# Patient Record
Sex: FEMALE | Race: WHITE | Employment: FULL TIME | URBAN - METROPOLITAN AREA
[De-identification: names, ages, dates, MRNs, and addresses within clinical notes are randomized per-mention and may not be internally consistent; named-entity substitution may affect disease eponyms.]

---

## 2019-10-31 ENCOUNTER — OFFICE VISIT (OUTPATIENT)
Dept: OBGYN CLINIC | Age: 29
End: 2019-10-31

## 2019-10-31 VITALS
WEIGHT: 161 LBS | HEIGHT: 66 IN | BODY MASS INDEX: 25.88 KG/M2 | DIASTOLIC BLOOD PRESSURE: 54 MMHG | SYSTOLIC BLOOD PRESSURE: 98 MMHG

## 2019-10-31 DIAGNOSIS — Z23 ENCOUNTER FOR IMMUNIZATION: ICD-10-CM

## 2019-10-31 DIAGNOSIS — Z84.89 FAMILY HISTORY OF GENETIC DISORDER: ICD-10-CM

## 2019-10-31 DIAGNOSIS — Z34.00 ENCOUNTER FOR SUPERVISION PREGNANCY IN PRIMIGRAVIDA, ANTEPARTUM: ICD-10-CM

## 2019-10-31 DIAGNOSIS — Z34.90 ENCOUNTER FOR SUPERVISION OF LOW-RISK PREGNANCY, ANTEPARTUM: Primary | ICD-10-CM

## 2019-10-31 LAB
ANTIBODY SCREEN, EXTERNAL: NEGATIVE
CYSTIC FIBROSIS, EXTERNAL: NEGATIVE
HBSAG, EXTERNAL: NEGATIVE
HCT, EXTERNAL: 40.3
HGB, EXTERNAL: 13.5
HIV, EXTERNAL: NORMAL
PLATELET CNT,   EXTERNAL: 218
RUBELLA, EXTERNAL: 1.11
T. PALLIDUM, EXTERNAL: NEGATIVE
TYPE, ABO & RH, EXTERNAL: NORMAL
URINALYSIS, EXTERNAL: NEGATIVE

## 2019-10-31 NOTE — PATIENT INSTRUCTIONS

## 2019-10-31 NOTE — PROGRESS NOTES
[facility]  [email or web address]  [phone]    Juancarlos Ritter MD, [address]     Chief complaint:  Irregular cycles  Last cycle; Patient's last menstrual period was 2019 (approximate). This is a new concern and and evaluation is planned. Current pregnancy history:  Augusto Simon is a No obstetric history on file. , 34 y.o. female [facility]   She presents for the evaluation of irregular menses and a positive pregnancy test.    LMP history:  Patient's last menstrual period was 2019 (approximate). .  The date of the beginning of her last menstrual period is certain, but it was shorter than usual.  Her menses are not regular. Her cycles occur about every 40 days  A urine pregnancy test was positive about 3 weeks ago. She was not using contraception at the estimated time of conception. Based on her LMP, her EGA is 8 weeks,2 days with and EDC of 20. Ultrasound data:  She had an ultrasound today which revealed a viable jimenez pregnancy with a gestational age of 7 weeks and 1 days giving an EDC of 20. Pregnancy symptoms:  She reports fatigue, breast tenderness, indigestion. She denies vomiting or pelvic pain. Since she found out she is pregnant, she has gained, 5 lbs. She reports her prepregnancy weight as 155 pounds. Relevant past pregnancy history:  She has the following pregnancy history:none. She does not have a history of  delivery. She does not have a history of a prior  section. Relevant past medical history:(relevant to this pregnancy):   none     Pap smear history:  Last pap smear: 2018  Results: within normal limits    Occupational history  Her occupation is: . Substance history:   She does not report current tobacco use. She does not report current alcohol use. She does not report current drug use. Exposure history: There are not indoor cat(s) in the home.   The patient was instructed not to change cat litter boxes during pregnancy. She does not report close contact with children on a regular basis. She has had chicken pox in the past.   Patient does not report issues with domestic violence. Genetic Screening/Teratology Counseling:   (Includes patient, baby's father, or anyone in either family with:)  3.  Patient's age >/= 28 at EDC?--29 y.o.       FOB age: 31years old. 2.  Thalassemia ([facility], [facility], or  background): MCV<80?--no  3. Neural tube defect (meningomyelocele, spina bifida, anencephaly)? --no  4. Congenital heart defect?--no  5. Down syndrome?--no  6. Jet-[employer]s ([address] [place] [address])? --no  7. Canavan's Disease?--no  8. Familial Dysautonomia?--no   9. Sickle cell disease or trait ()? --no   Has she been tested for sickle trait: Unknown  10. Hemophilia or other blood disorders?--no  11. Muscular dystrophy?--no  12. Cystic fibrosis? --no  13. [place]'s Chorea?--no  14. Mental retardation/autism (if yes was person tested for Fragile X)? -FOB: cousins once removed. MR male. Male 2nd cousin. 15.  Other inherited genetic or chromosomal disorder?- FH, hip dysplasia b/l pt's father. 12.  Maternal metabolic disorder (DM, PKU, etc)? --PCOS  17.  Patient or FOB with a child with a birth defect not listed above?--no  17a. Patient or FOB with a birth defect themselves?--no  25. Recurrent pregnancy loss, or stillbirth?--no  19. Any medications since LMP other than prenatal vitamins (include vitamins, supplements, OTC meds, drugs, alcohol)? -- pnv  20. Any other genetic/environmental exposure to discuss?--no. Infection History:  1. Lives with someone with TB or TB exposed?--no  2. Patient or partner has history of genital herpes?--no  3. Rash or viral illness since LMP?--no  4. History of STD (GC, CT, HPV, syphilis, HIV)? --no  5. Have you received a flu vaccine for the most recent flu season? -- no  6.   Have you or your sexual partner(s) travelled to a [address] area in the last 3 months? -- no    Past Medical History:   Diagnosis Date    PCOS (polycystic ovarian syndrome) 2018     Past Surgical History:   Procedure Laterality Date    HX WISDOM TEETH EXTRACTION  2017     Social History     Occupational History    Not on file   Tobacco Use    Smoking status: Never Smoker    Smokeless tobacco: Never Used   Substance and Sexual Activity    Alcohol use: Not Currently    Drug use: Never    Sexual activity: Yes     Partners: Male     Birth control/protection: None     Family History   Problem Relation Age of Onset    Endometriosis Mother     Other Mother         Abnormal uterine bleeding    No Known Problems Father     Breast Cancer Maternal Grandmother 52     OB History    Para Term  AB Living   1             SAB TAB Ectopic Molar Multiple Live Births                    # Outcome Date GA Lbr Nikita/2nd Weight Sex Delivery Anes PTL Lv   1 Current              No Known Allergies  Prior to Admission medications    Medication Sig Start Date End Date Taking? Authorizing Provider   prenatal vit,calc76/iron/folic (PNV 13-3 PO) Take  by mouth.    Yes Provider, Historical        Review of Systems - History obtained from the patient  Constitutional: negative for weight loss, fever, night sweats  HEENT: negative for hearing loss, earache, congestion, snoring, sorethroat  CV: negative for chest pain, palpitations, edema  Resp: negative for cough, shortness of breath, wheezing  GI: negative for change in bowel habits, abdominal pain, black or bloody stools  : negative for frequency, dysuria, hematuria, vaginal discharge  MSK: negative for back pain, joint pain, muscle pain  Breast: negative for breast lumps, nipple discharge, galactorrhea  Skin :negative for itching, rash, hives  Neuro: negative for dizziness, headache, confusion, weakness  Psych: negative for anxiety, depression, change in mood  Heme/lymph: negative for bleeding, bruising, pallor    Objective:  Visit Vitals  BP 98/54   Ht 5' 6\" (1.676 m)   Wt 161 lb (73 kg)   LMP 09/03/2019 (Approximate) Comment: \"shorter cycle than usual\"   BMI 25.99 kg/m²       Physical Exam:   Constitutional  · Appearance: well-nourished, well developed, alert, in no acute distress    HENT  · Head  · Face: appears normal  · Eyes: appear normal  · Ears: normal  · Mouth: normal  · Lips: no lesions    Neck  · Inspection/Palpation: normal appearance, no masses or tenderness  · Lymph Nodes: no lymphadenopathy present  · Thyroid: gland size normal, nontender, no nodules or masses present on palpation    Chest  · Respiratory Effort: breathing unlabored  · Auscultation: normal breath sounds    Cardiovascular  · Heart:  · Auscultation: regular rate and rhythm without murmur    Breasts  · Inspection of Breasts: breasts symmetrical, no skin changes, no discharge present, nipple appearance normal, no skin retraction present  · Palpation of Breasts and Axillae: no masses present on palpation, no breast tenderness  · Axillary Lymph Nodes: no lymphadenopathy present    Gastrointestinal  · Abdominal Examination: abdomen non-tender to palpation, normal bowel sounds, no masses present  · Liver and spleen: no hepatomegaly present, spleen not palpable  · Hernias: no hernias identified    Genitourinary  · External Genitalia: normal appearance for age, no discharge present, no tenderness present, no inflammatory lesions present, no masses present, no atrophy present  · Vagina: normal vaginal vault without central or paravaginal defects, no discharge present, no inflammatory lesions present, no masses present  · Bladder: non-tender to palpation  · Urethra: appears normal  · Cervix: normal appearing with discharge or lesions, os closed  · Uterus: enlarged, normal shape, soft  · Adnexa: no adnexal tenderness present, no adnexal masses present  · Perineum: perineum within normal limits, no evidence of trauma, no rashes or skin lesions present  · Anus: anus within normal limits, no hemorrhoids present  · Inguinal Lymph Nodes: no lymphadenopathy present    Skin  · General Inspection: no rash, no lesions identified    Neurologic/Psychiatric  · Mental Status:  · Orientation: grossly oriented to person, place and time  · Mood and Affect: mood normal, affect appropriate    Assessment:   Irregular cycles  Encounter Diagnoses   Name Primary?  Encounter for supervision of low-risk pregnancy, antepartum Yes    Family history of genetic disorder     Encounter for supervision pregnancy in primigravida, antepartum      Due date: US    Plan:   We discussed options of genetic screening and diagnostic testing including:  CF testing, CVS, amniocentesis first trimester screening/NT, MSAFP, and NIPT (handout given to patient for review and consent)  She is not interested in prenatal genetic testing of her fetus. Plan: 20 wk us, fragile x sma cf  Decline NT/nips  The course of pregnancy discussed including visit schedule, ultrasounds, lab testing, etc.  Pt advised to avoid alcoholic beverages and illicit/recreational drugs use  Recommend taking prenatal vitamins or folic acid daily with DHA/fish oil. The hospital and practice style discussed with coverage system. We discussed nutrition, toxoplasmosis precautions, sexual activity, exercise, need for influenza vaccine, environmental and work hazards, travel advice, screen for domestic violence, need for seat belts. We discussed seafood, unpasteurized dairy products, deli meat, artificial sweeteners, and caffeine intake. We recommend avoiding chemical and toxin exposures when possible. Information on prenatal and breastfeeding classes given. Information on circumcision given  Patient encouraged not to smoke. Discussed current prescription drug use. Given medication list.  Discussed the use of over the counter medications and chemicals.   She is advised to contact her MD with any questions. Pt understands risk of hemorrhage during pregnancy and post delivery and would accept blood products if necessary in life-threatening emergencies  We discussed signs and symptoms of abnormal pregnancies and miscarriage. Handouts given to pt. Early glucola for PCOS    Physician review of ultrasound performed by technician    TA ULTRASOUND PERFORMED  A SINGLE VIABLE 6W1D IUP IS SEEN WITH NORMAL CARDIAC RHYTHM. GESTATIONAL AGE BASED ON TODAYS ULTRASOUND. A NORMAL YOLK SAC IS SEEN. RIGHT OVARY APPEARS WITHIN NORMAL LIMITS. LEFT OVARY APPEARS WITHIN NORMAL LIMITS. NO FREE FLUID SEEN IN THE CDS. Today's ultrasound report and images were reviewed and discussed with the patient. Please see images and imaging report entered by technician in PACS for more detail and progress note and diagnosis entered by MD.    Imani Ramon MD    Orders Placed This Encounter    CULTURE, URINE    HEP B SURFACE AG    HIV SCREEN, [address] [place] [address]. W/REFLEX CONFIRM    CBC W/O DIFF    RUBELLA AB, IGG    T PALLIDUM SCREEN W/REFLEX    CYSTIC FIBROSIS MUTATION 80    SMN1 COPY NUMBER ANALYSIS    FRAGILE X SYNDROME, PCR WITH REFLEX TO SOUTHERN BLOT    HEMOGLOBIN FRACTIONATION    TYPE, ABO & RH    ANTIBODY SCREEN    prenatal vit,calc76/iron/folic (PNV 36-8 PO)    PAP IG, CT-NG, RFX APTIMA HPV ASCUS ([id number], [id number]))     Follow-up and Dispositions    · Return in about 5 weeks (around 12/5/2019) for Follow up OB visit.

## 2019-11-01 NOTE — PROGRESS NOTES
After obtaining consent, and per orders of Dr Ethel Driscoll, injection of Fluarix given in left deltoid by Nissa Cardenas LPN. Patient instructed to remain in clinic for 20 minutes afterwards, and to report any adverse reaction to me immediately. Lot: [id number] Exp: 6/30/20 NDC: 52955-477-76 , VIS given.

## 2019-11-02 LAB — BACTERIA UR CULT: NORMAL

## 2019-11-05 LAB
C TRACH RRNA CVX QL NAA+PROBE: NEGATIVE
CYTOLOGIST CVX/VAG CYTO: NORMAL
CYTOLOGY CVX/VAG DOC CYTO: NORMAL
CYTOLOGY CVX/VAG DOC THIN PREP: NORMAL
DX ICD CODE: NORMAL
LABCORP, 190119: NORMAL
Lab: NORMAL
N GONORRHOEA RRNA CVX QL NAA+PROBE: NEGATIVE
OTHER STN SPEC: NORMAL
STAT OF ADQ CVX/VAG CYTO-IMP: NORMAL

## 2019-11-05 NOTE — PROGRESS NOTES
Normal pap smear  Update per TP protocol. Update PMH/HM: include:  Date of pap, Cytology: wnl. For HR HPV results: list NEG or POS, when done.   Update PNL

## 2019-11-09 LAB
ABO GROUP BLD: NORMAL
ANNOTATION COMMENT IMP: NORMAL
ANNOTATION COMMENT IMP: NORMAL
BLD GP AB SCN SERPL QL: NEGATIVE
CARRIER DETECTION RATE, 450111: NORMAL
CFTR MUT ANL BLD/T: NORMAL
CLINICAL DATA, 450005: NORMAL
COMMENT: NORMAL
ELECTRONICALLY SIGNED BY, 450014: NORMAL
ERYTHROCYTE [DISTWIDTH] IN BLOOD BY AUTOMATED COUNT: 11.9 % (ref 12.3–15.4)
ETHNIC BACKGROUND STATED: NORMAL
FMR1 GENE CGG RPT BLD/T QL: NORMAL
GEN KNWLDGE REF ID: NORMAL
GENE DIS ANL CARRIER INTERP-IMP: NORMAL
GENETIC COUNSELOR, 450001: NORMAL
HBV SURFACE AG SERPL QL IA: NEGATIVE
HCT VFR BLD AUTO: 40.3 % (ref 34–46.6)
HGB A MFR BLD: 97.9 % (ref 96.4–98.8)
HGB A2 MFR BLD COLUMN CHROM: 2.1 % (ref 1.8–3.2)
HGB BLD-MCNC: 13.5 G/DL (ref 11.1–15.9)
HGB C MFR BLD: 0 %
HGB F MFR BLD: 0 % (ref 0–2)
HGB FRACT BLD-IMP: NORMAL
HGB OTHER MFR BLD HPLC: 0 %
HGB S BLD QL SOLY: NEGATIVE
HGB S MFR BLD: 0 %
HIV 1+2 AB+HIV1 P24 AG SERPL QL IA: NON REACTIVE
MCH RBC QN AUTO: 29.9 PG (ref 26.6–33)
MCHC RBC AUTO-ENTMCNC: 33.5 G/DL (ref 31.5–35.7)
MCV RBC AUTO: 89 FL (ref 79–97)
PLATELET # BLD AUTO: 218 X10E3/UL (ref 150–450)
RBC # BLD AUTO: 4.52 X10E6/UL (ref 3.77–5.28)
REF LAB TEST METHOD: NORMAL
RH BLD: POSITIVE
RUBV IGG SERPL IA-ACNC: 1.11 INDEX
SMN1 GENE MUT ANL BLD/T: NORMAL
SMN1 GENE MUT ANL BLD/T: NORMAL
SPECIMEN SOURCE: NORMAL
SPECIMEN(S) RECEIVED, 450004: NORMAL
T PALLIDUM AB SER QL IA: NEGATIVE
TEST PERFORMANCE INFO SPEC: NORMAL
WBC # BLD AUTO: 9.2 X10E3/UL (ref 3.4–10.8)

## 2019-11-09 NOTE — PROGRESS NOTES
Normal results, add to prenatal records. We can review in detail with patient at next visit.   Also add neg fragile x carrier w sma w cf

## 2019-12-05 ENCOUNTER — ROUTINE PRENATAL (OUTPATIENT)
Dept: OBGYN CLINIC | Age: 29
End: 2019-12-05

## 2019-12-05 VITALS — WEIGHT: 158 LBS | DIASTOLIC BLOOD PRESSURE: 60 MMHG | BODY MASS INDEX: 25.5 KG/M2 | SYSTOLIC BLOOD PRESSURE: 118 MMHG

## 2019-12-05 DIAGNOSIS — Z34.00 ENCOUNTER FOR SUPERVISION PREGNANCY IN PRIMIGRAVIDA, ANTEPARTUM: Primary | ICD-10-CM

## 2019-12-05 NOTE — PROGRESS NOTES
[facility]  [email or web address]  [phone]    Ilana Quinn MD, FACOG     Follow-up OB visit    Chief Complaint   Patient presents with    Routine Prenatal Visit       Vitals:    19 1320   BP: 118/60   Weight: 158 lb (71.7 kg)       Patient Active Problem List    Diagnosis Date Noted    Prenatal care, first pregnancy 10/31/2019        The patient reports the following concerns: none    Prenatal Visit    Vitals:    19 1320   BP: 118/60   Weight: 158 lb (71.7 kg)     See PN flowsheet for exam      34 y.o.  11w1d   Encounter Diagnosis   Name Primary?  Encounter for supervision pregnancy in primigravida, antepartum Yes        [] SAB/bleeding precautions reviewed   [] PTL/PPROM precautions reviewed   [] Labor precautions reviewed   [] Fetal kick counts discussed   [] Labs reviewed with patient   [] Dick Quivers precautions reviewed   [] Consent reviewed   [] Handouts given to pt   [] Glucola handout    [] GBS/labor/Magic Hour handout   []    [] We reviewed CDC recommendations for Tdap for patient and close contacts and RBA of receiving in pregnancy, advised obtaining in third trimester   [] Reviewed healthy nutrition in pregnancy and good exercise practices   [] We disc safer medications in pregnancy and referred patient to [facility] resources   [] We reviewed CDC recommendations for flu vaccine and RBA of receiving in pregnancy   []    []    []       Follow-up and Dispositions    · Return in about 4 weeks (around 2020) for Follow up OB visit. No orders of the defined types were placed in this encounter.       Ilana Quinn MD

## 2019-12-05 NOTE — PATIENT INSTRUCTIONS
Weeks 10 to 14 of Your Pregnancy: Care Instructions  Your Care Instructions    By weeks 10 to 14 of your pregnancy, the placenta has formed inside your uterus. It is possible to hear your baby's heartbeat with a special ultrasound device. Your baby's eyes can and do move. The arms and legs can bend. This is a good time to think about testing for birth defects. There are two types of tests: screening and diagnostic. Screening tests show the chance that a baby has a certain birth defect. They can't tell you for sure that your baby has a problem. Diagnostic tests show if a baby has a certain birth defect. It's your choice whether to have these tests. You and your partner can talk to your doctor or midwife about birth defects tests. Follow-up care is a key part of your treatment and safety. Be sure to make and go to all appointments, and call your doctor if you are having problems. It's also a good idea to know your test results and keep a list of the medicines you take. How can you care for yourself at home? Decide about tests  · You can have screening tests and diagnostic tests to check for birth defects. The decision to have a test for birth defects is personal. Think about your age, your chance of passing on a family disease, your need to know about any problems, and what you might do after you have the test results. ? Triple or quadruple (quad) blood tests. These screening tests can be done between 15 and 20 weeks of pregnancy. They check the amounts of three or four substances in your blood. The doctor looks at these test results, along with your age and other factors, to find out the chance that your baby may have certain problems. ? Amniocentesis. This diagnostic test is used to look for chromosomal problems in the baby's cells.  It can be done between 15 and 20 weeks of pregnancy, usually around week 16.  ? Nuchal translucency test. This test uses ultrasound to measure the thickness of the area at the back of the baby's neck. An increase in the thickness can be an early sign of Down syndrome. ? Chorionic villus sampling (CVS). This is a test that looks for certain genetic problems with your baby. The same genes that are in your baby are in the placenta. A small piece of the placenta is taken out and tested. This test is done when you are 10 to 13 weeks pregnant. Ease discomfort  · Slow down and take naps when you feel tired. · If your emotions swing, talk to someone. Crying, anxiety, and concentration problems are common. · If your gums bleed, try a softer toothbrush. If your gums are puffy and bleed a lot, see your dentist.  · If you feel dizzy:  ? Get up slowly after sitting or lying down. ? Drink plenty of fluids. ? Eat small snacks to keep your blood sugar stable. ? Put your head between your legs as though you were tying your shoelaces. ? Lie down with your legs higher than your head. Use pillows to prop up your feet. · If you have a headache:  ? Lie down. ? Ask your partner or a good friend for a neck massage. ? Try cool cloths over your forehead or across the back of your neck. ? Use acetaminophen (Tylenol) for pain relief. Do not use nonsteroidal anti-inflammatory drugs (NSAIDs), such as ibuprofen (Advil, Motrin) or naproxen (Aleve), unless your doctor says it is okay. · If you have a nosebleed, pinch your nose gently, and hold it for a short while. To prevent nosebleeds, try massaging a small dab of petroleum jelly, such as Vaseline, in your nostrils. · If your nose is stuffed up, try saline (saltwater) nose sprays. Do not use decongestant sprays. Care for your breasts  · Wear a bra that gives you good support. · Know that changes in your breasts are normal.  ? Your breasts may get larger and more tender. Tenderness usually gets better by 12 weeks. ? Your nipples may get darker and larger, and small bumps around your nipples may show more. ?  The veins in your chest and breasts may show more. · Don't worry about \"toughening'\" your nipples. Breastfeeding will naturally do this. Where can you learn more? Go to http://erica-joseph.info/. Enter Y121 in the search box to learn more about \"Weeks 10 to 14 of Your Pregnancy: Care Instructions. \"  Current as of: May 29, 2019  Content Version: 12.2  © 2995-5245 [employer]. Care instructions adapted under license by [employer] (which disclaims liability or warranty for this information). If you have questions about a medical condition or this instruction, always ask your healthcare professional. [address] any warranty or liability for your use of this information.

## 2019-12-12 ENCOUNTER — ROUTINE PRENATAL (OUTPATIENT)
Dept: OBGYN CLINIC | Age: 29
End: 2019-12-12

## 2019-12-12 VITALS
BODY MASS INDEX: 26.03 KG/M2 | HEIGHT: 66 IN | SYSTOLIC BLOOD PRESSURE: 108 MMHG | WEIGHT: 162 LBS | DIASTOLIC BLOOD PRESSURE: 54 MMHG

## 2019-12-12 DIAGNOSIS — O20.9 VAGINAL BLEEDING IN PREGNANCY, FIRST TRIMESTER: ICD-10-CM

## 2019-12-12 DIAGNOSIS — Z3A.12 12 WEEKS GESTATION OF PREGNANCY: Primary | ICD-10-CM

## 2019-12-12 NOTE — PROGRESS NOTES
TA ULTRASOUND PERFORMED. A SINGLE VIABLE 13W1D IUP IS SEEN WITH NORMAL CARDIAC RHYTHM. A SUBCHORIONIC HEMORRHAGE IS SEEN IN THE JOE. GESTATIONAL AGE BASED ON TODAYS US. AN ANTERIOR PLACENTA IS SEEN. NO FREE FLUID SEEN IN THE CDS. Abnormal bleeding note      Gaby Palomo is a 34 y.o. female who complains of vaginal bleeding in pregnancy. She is currently 12 weeks, 1 day pregnant. Pt complains of bright red bleeding with wiping and right side pelvic pain (sharp, off/on) since this morning. Pad or tampon count: changes every 4 hours. Associated symptoms include pelvic pain. Alleviating factors: none    Aggravating factors: none        Her relevant past medical history:   Past Medical History:   Diagnosis Date    Pap smear for cervical cancer screening 10/31/2019    Negative    PCOS (polycystic ovarian syndrome) 09/2018        Past Surgical History:   Procedure Laterality Date    HX WISDOM TEETH EXTRACTION  11/2017     Social History     Occupational History    Not on file   Tobacco Use    Smoking status: Never Smoker    Smokeless tobacco: Never Used   Substance and Sexual Activity    Alcohol use: Not Currently    Drug use: Never    Sexual activity: Yes     Partners: Male     Birth control/protection: None     Family History   Problem Relation Age of Onset    Endometriosis Mother     Other Mother         Abnormal uterine bleeding    No Known Problems Father     Breast Cancer Maternal Grandmother 52       No Known Allergies  Prior to Admission medications    Medication Sig Start Date End Date Taking? Authorizing Provider   prenatal vit,calc76/iron/folic (PNV 43-6 PO) Take  by mouth.     Provider, Historical        Review of Systems - History obtained from the patient  Constitutional: negative for weight loss, fever, night sweats  HEENT: negative for hearing loss, earache, congestion, snoring, sorethroat  CV: negative for chest pain, palpitations, edema  Resp: negative for cough, shortness of breath, wheezing  Breast: negative for breast lumps, nipple discharge, galactorrhea  GI: negative for change in bowel habits, abdominal pain, black or bloody stools  : negative for frequency, dysuria, hematuria  MSK: negative for back pain, joint pain, muscle pain  Skin: negative for itching, rash, hives  Neuro: negative for dizziness, headache, confusion, weakness  Psych: negative for anxiety, depression, change in mood  Heme/lymph: negative for bleeding, bruising, pallor      Objective:    Visit Vitals  /54   Ht 5' 6\" (1.676 m)   Wt 162 lb (73.5 kg)   LMP 09/03/2019 (Approximate) Comment: \"shorter cycle than usual\"   BMI 26.15 kg/m²          PHYSICAL EXAMINATION    Constitutional  · Appearance: well-nourished, well developed, alert, in no acute distress    HENT  · Head and Face: appears normal    Skin  · General Inspection: no rash, no lesions identified    Neurologic/Psychiatric  · Mental Status:  · Orientation: grossly oriented to person, place and time  · Mood and Affect: mood normal, affect appropriate    Assessment:   Pregnancy with bleeding and small sub-chorionic hemorrhage    Plan:   Reassured regarding pain and bleeding. Instructions given to pt. Handouts given to pt.

## 2020-01-02 ENCOUNTER — ROUTINE PRENATAL (OUTPATIENT)
Dept: OBGYN CLINIC | Age: 30
End: 2020-01-02

## 2020-01-02 VITALS
DIASTOLIC BLOOD PRESSURE: 60 MMHG | WEIGHT: 164 LBS | SYSTOLIC BLOOD PRESSURE: 100 MMHG | BODY MASS INDEX: 26.36 KG/M2 | HEIGHT: 66 IN

## 2020-01-02 DIAGNOSIS — Z34.02 ENCOUNTER FOR SUPERVISION OF NORMAL FIRST PREGNANCY IN SECOND TRIMESTER: Primary | ICD-10-CM

## 2020-01-02 DIAGNOSIS — Z87.42 HISTORY OF PCOS: ICD-10-CM

## 2020-01-02 NOTE — PATIENT INSTRUCTIONS

## 2020-01-02 NOTE — PROGRESS NOTES
_ [facility]  [email or web address]  [phone]    August Osgood, MD, FACOG     Follow-up OB visit    Chief Complaint   Patient presents with    Routine Prenatal Visit       Vitals:    01/02/20 1328   BP: 100/60   Weight: 164 lb (74.4 kg)   Height: 5' 6\" (1.676 m)       Patient Active Problem List    Diagnosis Date Noted    Prenatal care, first pregnancy 10/31/2019        The patient reports the following concerns: none, early glucola today    Prenatal Visit    Vitals:    01/02/20 1328   BP: 100/60   Weight: 164 lb (74.4 kg)   Height: 5' 6\" (1.676 m)     See PN flowsheet for exam      34 y.o. Yelena Riedel 15w1d   Encounter Diagnoses   Name Primary?  Encounter for supervision of normal first pregnancy in second trimester Yes    History of PCOS      decl ts, wants afp w glucola, early     [] SAB/bleeding precautions reviewed   [] PTL/PPROM precautions reviewed   [] Labor precautions reviewed   [] Fetal kick counts discussed   [] Labs reviewed with patient   [] Claryce Notice precautions reviewed   [] Consent reviewed   [] Handouts given to pt   [] Glucola handout    [] GBS/labor/Magic Hour handout   []    [] We reviewed CDC recommendations for Tdap for patient and close contacts and RBA of receiving in pregnancy, advised obtaining in third trimester   [] Reviewed healthy nutrition in pregnancy and good exercise practices   [] We disc safer medications in pregnancy and referred patient to [facility] resources   [] We reviewed CDC recommendations for flu vaccine and RBA of receiving in pregnancy   []    []    []       Follow-up and Dispositions    · Return in about 4 weeks (around 1/30/2020) for Follow up OB visit.          Orders Placed This Encounter    GLUCOSE, GESTATIONAL 1 Najma Arroyo MD

## 2020-01-03 LAB
AFP, MATERNAL, EXTERNAL: NORMAL
GLUCOSE 1H P 50 G GLC PO SERPL-MCNC: 109 MG/DL (ref 65–139)

## 2020-01-04 LAB
AFP ADJ MOM SERPL: 1.15
AFP INTERP SERPL-IMP: NORMAL
AFP INTERP SERPL-IMP: NORMAL
AFP SERPL-MCNC: 31.6 NG/ML
AGE AT DELIVERY: 28.1 YR
COMMENT, 018013: NORMAL
GA METHOD: NORMAL
GA: 15 WEEKS
IDDM PATIENT QL: NO
MULTIPLE PREGNANCY: NO
NEURAL TUBE DEFECT RISK FETUS: 7603 %
RESULTS, 017004: NORMAL

## 2020-02-05 NOTE — PATIENT INSTRUCTIONS

## 2020-02-06 ENCOUNTER — ROUTINE PRENATAL (OUTPATIENT)
Dept: OBGYN CLINIC | Age: 30
End: 2020-02-06

## 2020-02-06 ENCOUNTER — TELEPHONE (OUTPATIENT)
Dept: OBGYN CLINIC | Age: 30
End: 2020-02-06

## 2020-02-06 VITALS
BODY MASS INDEX: 26.9 KG/M2 | DIASTOLIC BLOOD PRESSURE: 66 MMHG | WEIGHT: 167.38 LBS | HEIGHT: 66 IN | SYSTOLIC BLOOD PRESSURE: 114 MMHG

## 2020-02-06 DIAGNOSIS — Z34.00 ENCOUNTER FOR SUPERVISION PREGNANCY IN PRIMIGRAVIDA, ANTEPARTUM: Primary | ICD-10-CM

## 2020-02-06 NOTE — TELEPHONE ENCOUNTER
Call received at 140PM    34year old  [address] w1d pregnant seen in the office today. Patient calling to get a verification letter of pregnancy for her job. Patient wanted MD to know that they have looked at the envelope and know the gender. Letter created as per MD order , printed ,signed and placed in envelope to be sent to patient confirmed address. Patient verbalized understanding.

## 2020-02-06 NOTE — LETTER
2/6/2020 3:45 PM 
 
Ms. [address] [phone] To Whom it may concern: 
 
 
Iglesia Caceres is under my care for pregnancy. By our best estimation her EDC is 6/24/2020. If you have any questions , please call my office at [phone] Sincerely, Eliza Matamoros MD

## 2020-02-06 NOTE — PROGRESS NOTES
[address] [place] [address] OB-GYN  [email or web address]  [phone]    Erasto Sparrow MD, FACOG       [facility]   FOLLOW UP OB NOTE WITH ULTRASOUND    Chief Complaint   Patient presents with    Routine Prenatal Visit     20w1d       The patient reports the following concerns: none    I discussed with the patient the limitations of ultrasound and that imaging can not rule out all birth defects, chromosomal problems, or other problems with the baby. Disc safer exercise in pregnancy. US findings: FETAL SURVEY  A SINGLE VIABLE IUP AT 20W1D GA BY LMP. FETAL CARDIAC MOTION OBSERVED. FETAL ANATOMY WELL VISUALIZED AND APPEARS WITHIN NORMAL LIMITS. NO ABNORMALITIES IDENTIFIED ON TODAYS EXAM.  APPROPRIATE GROWTH MEASURED; SIZE = DATES. SUSHMA, CERVIX AND PLACENTA APPEAR WITHIN NORMAL LIMITS. GENDER: XX, PATIENT DOES NOT KNOW. Physician review of ultrasound performed by technician    Today's ultrasound report and images were reviewed and discussed with the patient.   Please see images and imaging report entered by technician in PACS for more detail and progress note and diagnosis entered by MD.    Abby Vivas MD

## 2020-03-05 ENCOUNTER — ROUTINE PRENATAL (OUTPATIENT)
Dept: OBGYN CLINIC | Age: 30
End: 2020-03-05

## 2020-03-05 VITALS
DIASTOLIC BLOOD PRESSURE: 62 MMHG | BODY MASS INDEX: 27.64 KG/M2 | SYSTOLIC BLOOD PRESSURE: 110 MMHG | WEIGHT: 172 LBS | HEIGHT: 66 IN

## 2020-03-05 DIAGNOSIS — Z34.00 ENCOUNTER FOR SUPERVISION PREGNANCY IN PRIMIGRAVIDA, ANTEPARTUM: Primary | ICD-10-CM

## 2020-03-05 NOTE — PROGRESS NOTES
_ [facility]  [email or web address]  [phone]    Franci Hardin MD, FACOG     Follow-up OB visit    Chief Complaint   Patient presents with    Routine Prenatal Visit       Vitals:    20 1332   BP: 110/62   Weight: 172 lb (78 kg)   Height: 5' 6\" (1.676 m)       Patient Active Problem List    Diagnosis Date Noted    Prenatal care, first pregnancy 10/31/2019        The patient reports the following concerns: URTI    Prenatal Visit    Vitals:    20 1332   BP: 110/62   Weight: 172 lb (78 kg)   Height: 5' 6\" (1.676 m)     See PN flowsheet for exam      34 y.o.  24w1d   Encounter Diagnosis   Name Primary?  Encounter for supervision pregnancy in primigravida, antepartum Yes     Disc safer options for URTI  Rec PCP fu  rec ob classes     [] SAB/bleeding precautions reviewed   [x] PTL/PPROM precautions reviewed   [] Labor precautions reviewed   [] Fetal kick counts discussed   [] Labs reviewed with patient   [] Elder Parrot precautions reviewed   [] Consent reviewed   [] Handouts given to pt   [] Glucola handout    [] GBS/labor/Magic Hour handout   []    [] We reviewed CDC recommendations for Tdap for patient and close contacts and RBA of receiving in pregnancy, advised obtaining in third trimester   [] Reviewed healthy nutrition in pregnancy and good exercise practices   [] We disc safer medications in pregnancy and referred patient to [facility] loraine   [] We reviewed CDC recommendations for flu vaccine and RBA of receiving in pregnancy   []    []    []       Follow-up and Dispositions    · Return in about 4 weeks (around 2020) for Follow up OB visit. No orders of the defined types were placed in this encounter.       Franci Hardin MD

## 2020-03-05 NOTE — PATIENT INSTRUCTIONS
Weeks 22 to 26 of Your Pregnancy: Care Instructions  Your Care Instructions    As you enter your 7th month of pregnancy at week 26, your baby's lungs are growing stronger and getting ready to breathe. You may notice that your baby responds to the sound of your or your partner's voice. You may also notice that your baby does less turning and twisting and more squirming or jerking. Jerking often means that your baby has the hiccups. Hiccups are perfectly normal and are only temporary. You may want to think about attending a childbirth preparation class. This is also a good time to start thinking about whether you want to have pain medicine during labor. Most pregnant women are tested for gestational diabetes between weeks 25 and 28. Gestational diabetes occurs when your blood sugar level gets too high when you're pregnant. The test is important, because you can have gestational diabetes and not know it. But the condition can cause problems for your baby. Follow-up care is a key part of your treatment and safety. Be sure to make and go to all appointments, and call your doctor if you are having problems. It's also a good idea to know your test results and keep a list of the medicines you take. How can you care for yourself at home? Ease discomfort from your baby's kicking  · Change your position. Sometimes this will cause your baby to change position too. · Take a deep breath while you raise your arm over your head. Then breathe out while you drop your arm. Do Kegel exercises to prevent urine from leaking  · You can do Kegel exercises while you stand or sit. ? Squeeze the same muscles you would use to stop your urine. Your belly and thighs should not move. ? Hold the squeeze for 3 seconds, and then relax for 3 seconds. ? Start with 3 seconds. Then add 1 second each week until you are able to squeeze for 10 seconds. ? Repeat the exercise 10 to 15 times for each session.  Do three or more sessions each day.  Ease or reduce swelling in your feet, ankles, hands, and fingers  · If your fingers are puffy, take off your rings. · Do not eat high-salt foods, such as potato chips. · Prop up your feet on a stool or couch as much as possible. Sleep with pillows under your feet. · Do not stand for long periods of time or wear tight shoes. · Wear support stockings. Where can you learn more? Go to http://erica-joseph.info/. Enter G264 in the search box to learn more about \"Weeks 22 to 26 of Your Pregnancy: Care Instructions. \"  Current as of: May 29, 2019  Content Version: 12.2  © 6250-2541 [employer]. Care instructions adapted under license by [employer] (which disclaims liability or warranty for this information). If you have questions about a medical condition or this instruction, always ask your healthcare professional. [address] any warranty or liability for your use of this information.

## 2020-04-02 ENCOUNTER — ROUTINE PRENATAL (OUTPATIENT)
Dept: OBGYN CLINIC | Age: 30
End: 2020-04-02

## 2020-04-02 ENCOUNTER — HOSPITAL ENCOUNTER (OUTPATIENT)
Dept: LAB | Age: 30
Discharge: HOME | End: 2020-04-02

## 2020-04-02 VITALS
HEIGHT: 66 IN | DIASTOLIC BLOOD PRESSURE: 71 MMHG | WEIGHT: 178 LBS | BODY MASS INDEX: 28.61 KG/M2 | SYSTOLIC BLOOD PRESSURE: 120 MMHG

## 2020-04-02 DIAGNOSIS — Z34.00 PRENATAL CARE OF PRIMIGRAVIDA, ANTEPARTUM: ICD-10-CM

## 2020-04-02 DIAGNOSIS — Z23 ENCOUNTER FOR IMMUNIZATION: ICD-10-CM

## 2020-04-02 DIAGNOSIS — Z34.00 PRENATAL CARE OF PRIMIGRAVIDA, ANTEPARTUM: Primary | ICD-10-CM

## 2020-04-02 LAB
ERYTHROCYTE [DISTWIDTH] IN BLOOD BY AUTOMATED COUNT: 12.7 % (ref 11.5–14.5)
GLUCOSE 1H P 100 G GLC PO SERPL-MCNC: 148 MG/DL (ref 65–140)
GTT, 1 HR, GLUCOLA, EXTERNAL: NORMAL
HCT VFR BLD AUTO: 41.3 % (ref 35–47)
HCT, EXTERNAL: 41.3
HGB BLD-MCNC: 13.1 G/DL (ref 11.5–16)
HGB, EXTERNAL: 13.1
MCH RBC QN AUTO: 29.2 PG (ref 26–34)
MCHC RBC AUTO-ENTMCNC: 31.7 G/DL (ref 30–36.5)
MCV RBC AUTO: 92.2 FL (ref 80–99)
NRBC # BLD: 0 K/UL (ref 0–0.01)
NRBC BLD-RTO: 0 PER 100 WBC
PLATELET # BLD AUTO: 168 K/UL (ref 150–400)
PLATELET CNT,   EXTERNAL: 168
PMV BLD AUTO: 12.7 FL (ref 8.9–12.9)
RBC # BLD AUTO: 4.48 M/UL (ref 3.8–5.2)
WBC # BLD AUTO: 10.7 K/UL (ref 3.6–11)

## 2020-04-02 NOTE — PROGRESS NOTES
Pregnancy, Breastfeeding and COVID-19  Modified from: TelephoneAid[employer]tn  By Dianne Fox MD 3/22/2020    PREGNANT WOMEN:  We do not currently know if pregnant women have a greater chance of getting sick from COVID-19 than the general public nor whether they are more likely to have serious illness as a result. It is always important for pregnant women to protect themselves from illnesses. Pregnant women should do the same things as the general public to avoid infection. We do not know at this time if COVID-19 will cause problems during pregnancy or affect the health of the baby after birth. We still do not know if a pregnant woman with COVID-19 can pass the virus that causes COVID-19 to her fetus or baby during pregnancy or delivery. Currently, it does not appear that an infected mother will likely pass it to her  during delivery (vertical transmission). If you think you may need to be evaluated for COVID-19, call the COVID-19 hotline at 923-762-5421 or contact your PCP. For medications that you can take during pregnancy or for fevers:  http://[employer]/patients/resources-2/medications-in-pregnancy/     BREASTFEEDING WOMEN:  Much is unknown about how COVID-19 is spread. In limited studies on women with COVID-19 and other coronavirus infections, the virus has not been detected in breast milk; however we do not know whether mothers with COVID-19 can transmit the virus via breast milk. Breast milk is the best source of nutrition for most infants. A mother with confirmed COVID-19 or who is symptomatic with possible COVID-19 should try to avoid spreading the virus to her infant.  She should wash her hands before touching the infant and wear a face mask, if possible, while feeding at the breast.  If pumping, she should use good pump hygiene and consider having someone who is not at risk for COVID-19 feed her expressed breast milk to the infant. Please discuss any questions or concerns with your doctor or provider.

## 2020-04-02 NOTE — PROGRESS NOTES
[facility]  [email or web address]  [phone]    Earl Villagomez MD, FACOG     Follow-up OB visit    Chief Complaint   Patient presents with    Routine Prenatal Visit     28 weeks & 1 day       There were no vitals filed for this visit. Patient Active Problem List    Diagnosis Date Noted    Prenatal care of primigravida, antepartum 2020    Prenatal care, first pregnancy 10/31/2019        The patient reports the following concerns: none    Prenatal Visit    There were no vitals filed for this visit. See PN flowsheet for exam      34 y.o.  28w1d   Encounter Diagnosis   Name Primary?  Prenatal care of primigravida, antepartum Yes     gluclola  tdap   [] SAB/bleeding precautions reviewed   [x] PTL/PPROM precautions reviewed   [] Labor precautions reviewed   [] Fetal kick counts discussed   [] Labs reviewed with patient   [] Negrita Seals precautions reviewed   [] Consent reviewed   [] Handouts given to pt   [] Glucola handout    [] GBS/labor/Magic Hour handout   []    [] We reviewed CDC recommendations for Tdap for patient and close contacts and RBA of receiving in pregnancy, advised obtaining in third trimester   [] Reviewed healthy nutrition in pregnancy and good exercise practices   [] We disc safer medications in pregnancy and [facility]   [] We reviewed CDC recommendations for flu vaccine and RBA of receiving in pregnancy   []    []    []       Follow-up and Dispositions    · Return in about 2 weeks (around 2020) for Follow up OB visit. No orders of the defined types were placed in this encounter.       Earl Villagomez MD

## 2020-04-02 NOTE — PATIENT INSTRUCTIONS
Weeks 26 to 30 of Your Pregnancy: Care Instructions  Your Care Instructions    You are now in your last trimester of pregnancy. Your baby is growing rapidly. And you'll probably feel your baby moving around more often. Your doctor may ask you to count your baby's kicks. Your back may ache as your body gets used to your baby's size and length. If you haven't already had the Tdap shot during this pregnancy, talk to your doctor about getting it. It will help protect your  against pertussis infection. During this time, it's important to take care of yourself and pay attention to what your body needs. If you feel sexual, explore ways to be close with your partner that match your comfort and desire. Use the tips provided in this care sheet to find ways to be sexual in your own way. Follow-up care is a key part of your treatment and safety. Be sure to make and go to all appointments, and call your doctor if you are having problems. It's also a good idea to know your test results and keep a list of the medicines you take. How can you care for yourself at home? Take it easy at work  · Take frequent breaks. If possible, stop working when you are tired, and rest during your lunch hour. · Take bathroom breaks every 2 hours. · Change positions often. If you sit for long periods, stand up and walk around. · When you stand for a long time, keep one foot on a low stool with your knee bent. After standing a lot, sit with your feet up. · Avoid fumes, chemicals, and tobacco smoke. Be sexual in your own way  · Having sex during pregnancy is okay, unless your doctor tells you not to. · You may be very interested in sex, or you may have no interest at all. · Your growing belly can make it hard to find a good position during intercourse. Ingenio and explore. · You may get cramps in your uterus when your partner touches your breasts.   · A back rub may relieve the backache or cramps that sometimes follow orgasm. Learn about  labor  · Watch for signs of  labor. You may be going into labor if:  ? You have menstrual-like cramps, with or without nausea. ? You have about 6 or more contractions in 1 hour, even after you have had a glass of water and are resting. ? You have a low, dull backache that does not go away when you change your position. ? You have pain or pressure in your pelvis that comes and goes in a pattern. ? You have intestinal cramping or flu-like symptoms, with or without diarrhea.  ? You notice an increase or change in your vaginal discharge. Discharge may be heavy, mucus-like, watery, or streaked with blood. ? Your water breaks. · If you think you have  labor:  ? Drink 2 or 3 glasses of water or juice. Not drinking enough fluids can cause contractions. ? Stop what you are doing, and empty your bladder. Then lie down on your left side for at least 1 hour. ? While lying on your side, find your breast bone. Put your fingers in the soft spot just below it. Move your fingers down toward your belly button to find the top of your uterus. Check to see if it is tight. ? Contractions can be weak or strong. Record your contractions for an hour. Time a contraction from the start of one contraction to the start of the next one.  ? Single or several strong contractions without a pattern are called [place] contractions. They are practice contractions but not the start of labor. They often stop if you change what you are doing. ? Call your doctor if you have regular contractions. Where can you learn more? Go to http://erica-joseph.info/  Enter B332 in the search box to learn more about \"Weeks 26 to 30 of Your Pregnancy: Care Instructions. \"  Current as of: May 29, 2019Content Version: 12.4  © 1162-7496 [employer]. Care instructions adapted under license by [employer] (which disclaims liability or warranty for this information).  If you have questions about a medical condition or this instruction, always ask your healthcare professional. [address] any warranty or liability for your use of this information. Backache During Pregnancy: Care Instructions  Your Care Instructions    Back pain has many possible causes. It is often caused by problems with muscles and ligaments in your back. The extra weight during pregnancy can put stress on your back. Moving, lifting, standing, sitting, or sleeping in an awkward way also can strain your back. Back pain can also be a sign of labor. Although it may hurt a lot, back pain often improves on its own. Use good home treatment, and take care not to stress your back. Follow-up care is a key part of your treatment and safety. Be sure to make and go to all appointments, and call your doctor if you are having problems. It's also a good idea to know your test results and keep a list of the medicines you take. How can you care for yourself at home? · Ask your doctor about taking acetaminophen (Tylenol) for pain. Do not take aspirin, ibuprofen (Advil, Motrin), or naproxen (Aleve). · Do not take two or more pain medicines at the same time unless the doctor told you to. Many pain medicines have acetaminophen, which is Tylenol. Too much acetaminophen (Tylenol) can be harmful. · Lie on your side with your knees and hips bent and a pillow between your legs. This reduces stress on your back. · Put ice or cold packs on your back for 10 to 20 minutes at a time, several times a day. Put a thin cloth between the ice and your skin. · Warm baths may also help reduce pain. · Change positions every 30 minutes. Take breaks if you must sit for a long time. Get up and walk around. · Ask your doctor about how much exercise you can do. You may feel better taking short walks or doing gentle movements and stretching in a swimming pool.   · Ask your doctor about exercises to stretch and strengthen your back.  When should you call for help? Call your doctor now or seek immediate medical care if:    · You think you are in labor.     · You have new numbness in your buttocks, genital or rectal areas, or legs.     · You have a new loss of bowel or bladder control.    Watch closely for changes in your health, and be sure to contact your doctor if:    · You do not get better as expected. Where can you learn more? Go to http://erica-joseph.info/  Enter Y908 in the search box to learn more about \"Backache During Pregnancy: Care Instructions. \"  Current as of: May 29, 2019Content Version: 12.4  © 8206-0402 [employer]. Care instructions adapted under license by [employer] (which disclaims liability or warranty for this information). If you have questions about a medical condition or this instruction, always ask your healthcare professional. [address] any warranty or liability for your use of this information. Vaccine Information Statement     Tdap (Tetanus, Diphtheria, Pertussis) Vaccine: What You Need to Know    Many Vaccine Information Statements are available in Divehi and other languages. See www.immunize.org/vis. Hojas de Información Sobre Vacunas están disponibles en español y en muchos otros idiomas. Visite WorthScale.si    1. Why get vaccinated? Tetanus, diphtheria, and pertussis are very serious diseases. Tdap vaccine can protect us from these diseases. And, Tdap vaccine given to pregnant women can protect  babies against pertussis. TETANUS (Lockjaw) is rare in the [facility] today. It causes painful muscle tightening and stiffness, usually all over the body.  It can lead to tightening of muscles in the head and neck so you cant open your mouth, swallow, or sometimes even breathe. Tetanus kills about 1 out of 10 people who are infected even after receiving the best medical care.       DIPHTHERIA is also rare in the [place] today. It can cause a thick coating to form in the back of the throat.  It can lead to breathing problems, heart failure, paralysis, and death. PERTUSSIS (Whooping Cough) causes severe coughing spells, which can cause difficulty breathing, vomiting, and disturbed sleep.  It can also lead to weight loss, incontinence, and rib fractures. Up to 2 in 100 adolescents and 5 in 100 adults with pertussis are hospitalized or have complications, which could include pneumonia or death. These diseases are caused by bacteria. Diphtheria and pertussis are spread from person to person through secretions from coughing or sneezing. Tetanus enters the body through cuts, scratches, or wounds. Before vaccines, as many as 200,000 cases of diphtheria, 200,000 cases of pertussis, and hundreds of cases of tetanus, were reported in the [place] each year. Since vaccination began, reports of cases for tetanus and diphtheria have dropped by about 99% and for pertussis by about 80%. 2. Tdap vaccine    Tdap vaccine can protect adolescents and adults from tetanus, diphtheria, and pertussis. One dose of Tdap is routinely given at age 6 or 15. People who did not get Tdap at that age should get it as soon as possible. Tdap is especially important for health care professionals and anyone having close contact with a baby younger than 12 months. Pregnant women should get a dose of Tdap during every pregnancy, to protect the  from pertussis. Infants are most at risk for severe, life-threatening complications from pertussis. Another vaccine, called Td, protects against tetanus and diphtheria, but not pertussis. A Td booster should be given every 10 years. Tdap may be given as one of these boosters if you have never gotten Tdap before. Tdap may also be given after a severe cut or burn to prevent tetanus infection.     Your doctor or the person giving you the vaccine can give you more information. Tdap may safely be given at the same time as other vaccines. 3. Some people should not get this vaccine     A person who has ever had a life-threatening allergic reaction after a previous dose of any diphtheria, tetanus or pertussis containing vaccine, OR has a severe allergy to any part of this vaccine, should not get Tdap vaccine. Tell the person giving the vaccine about any severe allergies.  Anyone who had coma or long repeated seizures within 7 days after a childhood dose of DTP or DTaP, or a previous dose of Tdap, should not get Tdap, unless a cause other than the vaccine was found. They can still get Td.  Talk to your doctor if you:  - have seizures or another nervous system problem,  - had severe pain or swelling after any vaccine containing diphtheria, tetanus or pertussis,   - ever had a condition called Guillain Barré Syndrome (GBS),  - arent feeling well on the day the shot is scheduled. 4. Risks    With any medicine, including vaccines, there is a chance of side effects. These are usually mild and go away on their own. Serious reactions are also possible but are rare. Most people who get Tdap vaccine do not have any problems with it.     Mild Problems following Tdap  (Did not interfere with activities)   Pain where the shot was given (about 3 in 4 adolescents or 2 in 3 adults)   Redness or swelling where the shot was given (about 1 person in 5)   Mild fever of at least 100.4°F (up to about 1 in 25 adolescents or 1 in 100 adults)   Headache (about 3 or 4 people in 10)   Tiredness (about 1 person in 3 or 4)   Nausea, vomiting, diarrhea, stomach ache (up to 1 in 4 adolescents or 1 in 10 adults)   Chills,  sore joints (about 1 person in 10)   Body aches (about 1 person in 3 or 4)    Rash, swollen glands (uncommon)    Moderate Problems following Tdap  (Interfered with activities, but did not require medical attention)   Pain where the shot was given (up to 1 in 5 or 6)    Redness or swelling where the shot was given (up to about 1 in 16 adolescents or 1 in 12 adults)   Fever over 102°F (about 1 in 100 adolescents or 1 in 250 adults)   Headache (about 1 in 7 adolescents or 1 in 10 adults)   Nausea, vomiting, diarrhea, stomach ache (up to 1 or 3 people in 100)   Swelling of the entire arm where the shot was given (up to about 1 in 500). Severe Problems following Tdap  (Unable to perform usual activities; required medical attention)   Swelling, severe pain, bleeding, and redness in the arm where the shot was given (rare). Problems that could happen after any vaccine:     People sometimes faint after a medical procedure, including vaccination. Sitting or lying down for about 15 minutes can help prevent fainting, and injuries caused by a fall. Tell your doctor if you feel dizzy, or have vision changes or ringing in the ears.  Some people get severe pain in the shoulder and have difficulty moving the arm where a shot was given. This happens very rarely.  Any medication can cause a severe allergic reaction. Such reactions from a vaccine are very rare, estimated at fewer than 1 in a million doses, and would happen within a few minutes to a few hours after the vaccination. As with any medicine, there is a very remote chance of a vaccine causing a serious injury or death. The safety of vaccines is always being monitored. For more information, visit: www.cdc.gov/vaccinesafety/    5. What if there is a serious problem? What should I look for?  Look for anything that concerns you, such as signs of a severe allergic reaction, very high fever, or unusual behavior.  Signs of a severe allergic reaction can include hives, swelling of the face and throat, difficulty breathing, a fast heartbeat, dizziness, and weakness. These would usually start a few minutes to a few hours after the vaccination. What should I do?    If you think it is a severe allergic reaction or other emergency that cant wait, call 9-1-1 or get the person to the nearest hospital. Otherwise, call your doctor.  Afterward, the reaction should be reported to the Vaccine Adverse Event Reporting System (VAERS). Your doctor might file this report, or you can do it yourself through the VAERS web site at www.vaers. [facility].gov, or by calling 1-745.164.4969. VAERS does not give medical advice. 6. The National Vaccine Injury Compensation Program    The [facility] Vaccine Injury Compensation Program (VICP) is a federal program that was created to compensate people who may have been injured by certain vaccines. Persons who believe they may have been injured by a vaccine can learn about the program and about filing a claim by calling 2-654.186.4738 or visiting the [facility] website at www.[facility].gov/vaccinecompensation. There is a time limit to file a claim for compensation. 7. How can I learn more?  Ask your doctor. He or she can give you the vaccine package insert or suggest other sources of information.  Call your local or state health department.  Contact the Centers for Disease Control and Prevention (CDC):  - Call 7-291.194.8866 (7-270-HLS-INFO) or  - Visit CDCs website at www.cdc.gov/vaccines      Vaccine Information Statement   Tdap Vaccine  (2/24/2015)  42 REG [address] [id number]    Department of Health and Human Services  Centers for Disease Control and Prevention    Office Use Only

## 2020-04-02 NOTE — PROGRESS NOTES
Opal Hadley is a 34 y.o. female who presents for routine immunizations. She denies any symptoms , reactions or allergies that would exclude them from being immunized today. Risks and adverse reactions were discussed and the VIS was given to them. All questions were addressed. She was observed for 10 min post injection. There were no reactions observed.     Zaki Molina LPN

## 2020-04-03 NOTE — PROGRESS NOTES
Abnormal results.    Notify patient even if [facility] message read  Update chart, PN labs/problem list, if needed  Rec 3hr asap

## 2020-04-06 ENCOUNTER — HOSPITAL ENCOUNTER (OUTPATIENT)
Dept: LAB | Age: 30
Discharge: HOME | End: 2020-04-06

## 2020-04-06 ENCOUNTER — LAB ONLY (OUTPATIENT)
Dept: OBGYN CLINIC | Age: 30
End: 2020-04-06

## 2020-04-06 DIAGNOSIS — R73.09 ABNORMAL GLUCOSE TOLERANCE TEST: Primary | ICD-10-CM

## 2020-04-06 DIAGNOSIS — R73.09 ABNORMAL GLUCOSE TOLERANCE TEST: ICD-10-CM

## 2020-04-06 LAB
GESTATIONAL 3HR GTT,GESTA: ABNORMAL
GLUCOSE 1H P 100 G GLC PO SERPL-MCNC: 207 MG/DL (ref 65–180)
GLUCOSE P FAST SERPL-MCNC: 62 MG/DL (ref 65–95)
GLUCOSE, 2 HR,GSTT2: 146 MG/DL (ref 65–155)
GLUCOSE, 3 HR,GSTT3: 74 MG/DL (ref 65–140)
GTT 120 MIN, EXTERNAL: 146
GTT 180 MIN, EXTERNAL: 74
GTT 60 MIN, EXTERNAL: 207
GTT, FASTING, EXTERNAL: 62

## 2020-04-07 NOTE — PROGRESS NOTES
PNL/PL updated  Patient aware of results via [employer].     Viewed by Mellissa Ross on 4/7/2020 12:50 PM   Written by Tamara Magaña MD on 4/7/2020 11:46 AM

## 2020-04-16 ENCOUNTER — ROUTINE PRENATAL (OUTPATIENT)
Dept: OBGYN CLINIC | Age: 30
End: 2020-04-16

## 2020-04-16 ENCOUNTER — VIRTUAL VISIT (OUTPATIENT)
Dept: OBGYN CLINIC | Age: 30
End: 2020-04-16

## 2020-04-16 VITALS — WEIGHT: 183 LBS | HEIGHT: 66 IN | BODY MASS INDEX: 29.41 KG/M2

## 2020-04-16 VITALS — HEIGHT: 66 IN | WEIGHT: 183 LBS | BODY MASS INDEX: 29.41 KG/M2

## 2020-04-16 DIAGNOSIS — Z34.00 ENCOUNTER FOR SUPERVISION PREGNANCY IN PRIMIGRAVIDA, ANTEPARTUM: Primary | ICD-10-CM

## 2020-04-16 DIAGNOSIS — Z34.00 PRENATAL CARE OF PRIMIGRAVIDA, ANTEPARTUM: Primary | ICD-10-CM

## 2020-04-16 NOTE — PROGRESS NOTES
[employer] Video visit    Emily Issa is a 34 y.o. female who was seen by synchronous (real-time) audio-video technology on 2020. We discussed the expected course, resolution and complications of the diagnosis(es) in detail. Medication risks, benefits, costs, interactions, and alternatives were discussed as indicated. I advised her to contact the office if her condition worsens, changes or fails to improve as anticipated. She expressed understanding with the diagnosis(es) and plan. Pursuant to the emergency declaration under the [facility], [facility] waiver authority and the [employer] and [employer] Act, this Virtual  Visit was conducted, with patient's consent, to reduce the patient's risk of exposure to COVID-19 and provide continuity of care for an established patient. Services were provided through a video synchronous discussion virtually to substitute for in-person clinic visit. By [address] [place] [address]  [email or web address]  [phone]    Earl Villagomez MD, FACOG     Follow-up OB visit    Chief Complaint   Patient presents with    Routine Prenatal Visit       Vitals:    20 1337   Weight: 183 lb (83 kg)   Height: 5' 6\" (1.676 m)       Patient Active Problem List    Diagnosis Date Noted    Prenatal care of primigravida, antepartum 2020    Prenatal care, first pregnancy 10/31/2019        The patient reports the following concerns for worsening acid reflux and she has also noticed some swelling in both feet. Patient denies any pain. Prenatal Visit    Vitals:    20 1337   Weight: 183 lb (83 kg)   Height: 5' 6\" (1.676 m)     See PN flowsheet for exam      34 y.o.  30w1d   Encounter Diagnosis   Name Primary?     Prenatal care of primigravida, antepartum Yes       Reviewed 3hr/labs, rec healthy balanced diet  PIH Precautions reveiewed  DVT PE precautions, pt will try compression socks: just received  Disc safer meds for GERD sx  VV  2wk    [] SAB/bleeding precautions reviewed   [x] PTL/PPROM precautions reviewed   [] Labor precautions reviewed   [] Fetal kick counts discussed   [] Labs reviewed with patient   [] Lunda [employer] precautions reviewed   [] Consent reviewed   [] Handouts given to pt   [] Glucola handout    [] GBS/labor/Magic Hour handout   []    [] We reviewed CDC recommendations for Tdap for patient and close contacts and RBA of receiving in pregnancy, advised obtaining in third trimester   [] Reviewed healthy nutrition in pregnancy and good exercise practices   [] We disc safer medications in pregnancy and referred patient to [facility] resources   [] We reviewed CDC recommendations for flu vaccine and RBA of receiving in pregnancy   []    []    []       Follow-up and Dispositions    · Return in about 2 weeks (around 4/30/2020) for Follow up OB visit. No orders of the defined types were placed in this encounter.       Jacki Jones MD

## 2020-04-16 NOTE — PROGRESS NOTES
[facility] Video visit    Emilio Hua is a 34 y.o. female who was seen by synchronous (real-time) audio-video technology on 4/16/2020. We discussed the expected course, resolution and complications of the diagnosis(es) in detail. Medication risks, benefits, costs, interactions, and alternatives were discussed as indicated. I advised her to contact the office if her condition worsens, changes or fails to improve as anticipated. She expressed understanding with the diagnosis(es) and plan. Pursuant to the emergency declaration under the [facility], [facility] waiver authority and the [employer] and [employer] Act, this Virtual  Visit was conducted, with patient's consent, to reduce the patient's risk of exposure to COVID-19 and provide continuity of care for an established patient. Services were provided through a video synchronous discussion virtually to substitute for in-person clinic visit.

## 2020-04-16 NOTE — PATIENT INSTRUCTIONS
Nutrition During Pregnancy: Care Instructions Your Care Instructions Healthy eating when you are pregnant is important for you and your baby. It can help you feel well and have a successful pregnancy and delivery. During pregnancy your nutrition needs increase. Even if you have excellent eating habits, your doctor may recommend a multivitamin to make sure you get enough iron and folic acid. Many pregnant women wonder how much weight they should gain. In general, women who were at a healthy weight before they became pregnant should gain between 25 and 35 pounds. Women who were overweight before pregnancy are usually advised to gain 15 to 25 pounds. Women who were underweight before pregnancy are usually advised to gain 28 to 40 pounds. Your doctor will work with you to set a weight goal that is right for you. Gaining a healthy amount of weight helps you have a healthy baby. Follow-up care is a key part of your treatment and safety. Be sure to make and go to all appointments, and call your doctor if you are having problems. It's also a good idea to know your test results and keep a list of the medicines you take. How can you care for yourself at home? · Eat plenty of fruits and vegetables. Include a variety of orange, yellow, and leafy dark-green vegetables every day. · Choose whole-grain bread, cereal, and pasta. Good choices include whole wheat bread, whole wheat pasta, brown rice, and oatmeal. 
· Get 4 or more servings of milk and milk products each day. Good choices include nonfat or low-fat milk, yogurt, and cheese. If you cannot eat milk products, you can get calcium from calcium-fortified products such as orange juice, soy milk, and tofu. Other non-milk sources of calcium include leafy green vegetables, such as broccoli, kale, mustard greens, turnip greens, bok salo, and brussels sprouts. · If you eat meat, pick lower-fat types.  Good choices include lean cuts of meat and chicken or turkey without the skin. · Do not eat shark, swordfish, rj mackerel, or tilefish. They have high levels of mercury, which is dangerous to your baby. You can eat up to 12 ounces a week of fish or shellfish that have low mercury levels. Good choices include shrimp, wild salmon, pollock, and catfish. Do not eat more than 6 ounces of tuna each week. · Heat lunch meats (such as turkey, ham, or bologna) to 165°F before you eat them. This reduces your risk of getting sick from a kind of bacteria that can be found in lunch meats. · Do not eat unpasteurized soft cheeses, such as brie, feta, fresh mozzarella, and blue cheese. They have a bacteria that could harm your baby. · Limit caffeine. If you drink coffee or tea, have no more than 1 cup a day. Caffeine is also found in isaiah. · Do not drink any alcohol. No amount of alcohol has been found to be safe during pregnancy. · Do not diet or try to lose weight. For example, do not follow a low-carbohydrate diet. If you are overweight at the start of your pregnancy, your doctor will work with you to manage your weight gain. · Tell your doctor about all vitamins and supplements you take. When should you call for help? Watch closely for changes in your health, and be sure to contact your doctor if you have any problems. Where can you learn more? Go to http://erica-joseph.info/ Enter Y785 in the search box to learn more about \"Nutrition During Pregnancy: Care Instructions. \" Current as of: May 29, 2019Content Version: 12.4 © 7768-3022 Healthwise, Incorporated. Care instructions adapted under license by [employer] (which disclaims liability or warranty for this information). If you have questions about a medical condition or this instruction, always ask your healthcare professional. [address] any warranty or liability for your use of this information. Pregnancy, Breastfeeding and COVID-19 Modified from: TelephoneA[employer]tn By Giacomo Wolfe MD 3/22/2020 PREGNANT WOMEN: 
We do not currently know if pregnant women have a greater chance of getting sick from COVID-19 than the general public nor whether they are more likely to have serious illness as a result. It is always important for pregnant women to protect themselves from illnesses. Pregnant women should do the same things as the general public to avoid infection. We do not know at this time if COVID-19 will cause problems during pregnancy or affect the health of the baby after birth. We still do not know if a pregnant woman with COVID-19 can pass the virus that causes COVID-19 to her fetus or baby during pregnancy or delivery. Currently, it does not appear that an infected mother will likely pass it to her  during delivery (vertical transmission). If you think you may need to be evaluated for COVID-19, call the COVID-19 hotline at 684-677-6628 or contact your PCP. For medications that you can take during pregnancy or for fevers: 
http://[email or web address]/patients/resources-[email or web address]/medications-[email or web address]-pregnancy/ 
  
BREASTFEEDING WOMEN: 
Much is unknown about how COVID-19 is spread. In limited studies on women with COVID-19 and other coronavirus infections, the virus has not been detected in breast milk; however we do not know whether mothers with COVID-19 can transmit the virus via breast milk. Breast milk is the best source of nutrition for most infants. A mother with confirmed COVID-19 or who is symptomatic with possible COVID-19 should try to avoid spreading the virus to her infant. She should wash her hands before touching the infant and wear a face mask, if possible, while feeding at the breast.  If pumping, she should use good pump hygiene and consider having someone who is not at risk for COVID-19 feed her expressed breast milk to the infant. Please discuss any questions or concerns with your doctor or provider. For heartburn. You may continue TUMS and add ranitidine/zantac 75 to 150mg 1-2 times per day. Let me know if that does not help.

## 2020-04-16 NOTE — Clinical Note
pls confirm VV 2 -3 wks x2 and YOHAN visit 36 wk w US if not seeing MFM for some reason. Pls contact pt when FMLA done, faxed yesterday.

## 2020-04-27 ENCOUNTER — VIRTUAL VISIT (OUTPATIENT)
Dept: OBGYN CLINIC | Age: 30
End: 2020-04-27

## 2020-04-27 ENCOUNTER — ROUTINE PRENATAL (OUTPATIENT)
Dept: OBGYN CLINIC | Age: 30
End: 2020-04-27

## 2020-04-27 VITALS — WEIGHT: 183 LBS | BODY MASS INDEX: 29.41 KG/M2 | HEIGHT: 66 IN

## 2020-04-27 DIAGNOSIS — Z34.00 PRENATAL CARE OF PRIMIGRAVIDA, ANTEPARTUM: Primary | ICD-10-CM

## 2020-04-27 NOTE — PROGRESS NOTES
[facility] Virtual Video visit    Leela Gomez 31 y.o.  female who was seen by synchronous (real-time) audio-video technology on 2020. We discussed the expected course, resolution and complications of the diagnosis(es) in detail. Medication risks, benefits, costs, interactions, and alternatives were discussed as indicated. I advised her to contact the office if her condition worsens, changes or fails to improve as anticipated. She expressed understanding with the diagnosis(es) and plan. Pursuant to the emergency declaration under the [facility] waiver authority and the [employer] Act, this Virtual  Visit was conducted, with patient's consent, to reduce the patient's risk of exposure to COVID-19 and provide continuity of care for an established patient. Services were provided through a video synchronous discussion virtually to substitute for in-person clinic visit. [facility]  [email or web address]  [phone]    Viktoriya Lambert MD, FACOG     Follow-up OB visit    Chief Complaint   Patient presents with    Routine Prenatal Visit       Vitals:    20 1310   Weight: 183 lb (83 kg)   Height: 5' 6\" (1.676 m)       Patient Active Problem List    Diagnosis Date Noted    Prenatal care of primigravida, antepartum 2020    Prenatal care, first pregnancy 10/31/2019        The patient reports the following concerns of acid reflux. Patient states she has been experiencing more episodes of heart burn and regurgitation. LE swelling resolved with support hose.   Taking pepcid  Prenatal Visit    Vitals:    20 1310   Weight: 183 lb (83 kg)   Height: 5' 6\" (1.676 m)     See PN flowsheet for exam    Objective:     General: alert, cooperative, no distress   Mental  status: mental status: alert, oriented to person, place, and time, normal mood, behavior, speech, dress, motor activity, and thought processes   Resp: resp: normal effort and no respiratory distress   Neuro: neuro: no gross deficits   Skin: skin: no discoloration or lesions of concern on visible areas   Due to this being a TeleHealth evaluation, many elements of the physical examination are unable to be assessed. 31 y.o.  31w5d   Encounter Diagnosis   Name Primary?  Prenatal care of primigravida, antepartum Yes       This patient was seen virtually during the COVID-19 pandemic. Please note some clinical care decisions may be influenced because of the current outbreak. Disc safer options for GERD sx       [] SAB/bleeding precautions reviewed   [x] PTL/PPROM precautions reviewed   [] Labor precautions reviewed   [] Fetal kick counts discussed   [] Labs reviewed with patient   [] Orelia Floras precautions reviewed   [] Consent reviewed   [] Handouts given to pt   [] Glucola handout    [] GBS/labor/Magic Hour handout   []    [] We reviewed CDC recommendations for Tdap for patient and close contacts and RBA of receiving in pregnancy, advised obtaining in third trimester   [] Reviewed healthy nutrition in pregnancy and good exercise practices   [] We disc safer medications in pregnancy and referred patient to Elio baron   [] We reviewed CDC recommendations for flu vaccine and RBA of receiving in pregnancy   []    []    []       Follow-up and Dispositions    · Return in about 2 weeks (around 2020) for Follow up OB visit. No orders of the defined types were placed in this encounter.       Balbina Harrison MD

## 2020-04-27 NOTE — PATIENT INSTRUCTIONS

## 2020-05-11 ENCOUNTER — VIRTUAL VISIT (OUTPATIENT)
Dept: OBGYN CLINIC | Age: 30
End: 2020-05-11

## 2020-05-11 ENCOUNTER — ROUTINE PRENATAL (OUTPATIENT)
Dept: OBGYN CLINIC | Age: 30
End: 2020-05-11

## 2020-05-11 DIAGNOSIS — Z34.00 PRENATAL CARE OF PRIMIGRAVIDA, ANTEPARTUM: Primary | ICD-10-CM

## 2020-05-11 DIAGNOSIS — Z34.00 ENCOUNTER FOR SUPERVISION PREGNANCY IN PRIMIGRAVIDA, ANTEPARTUM: ICD-10-CM

## 2020-05-11 NOTE — PROGRESS NOTES
[facility] Virtual Video visit    Lexi Shape 31 y.o.  female who was seen by synchronous (real-time) audio-video technology on 2020. We discussed the expected course, resolution and complications of the diagnosis(es) in detail. Medication risks, benefits, costs, interactions, and alternatives were discussed as indicated. I advised her to contact the office if her condition worsens, changes or fails to improve as anticipated. She expressed understanding with the diagnosis(es) and plan. Pursuant to the emergency declaration under the [facility], [facility] waiver authority and the [employer] Act, this Virtual  Visit was conducted, with patient's consent, to reduce the patient's risk of exposure to COVID-19 and provide continuity of care for an established patient. Services were provided through a video synchronous discussion virtually to substitute for in-person clinic visit. _ [address] [place] [address] OB-GYN  [email or web address]  [phone]    Valeda Dubin, MD, FACOG     Follow-up OB visit    Chief Complaint   Patient presents with    Routine Prenatal Visit       There were no vitals filed for this visit. Patient Active Problem List    Diagnosis Date Noted    Prenatal care of primigravida, antepartum 2020    Prenatal care, first pregnancy 10/31/2019        The patient reports the following concerns of tenderness on her bladder with physical movement and abnormal vaginal discharge x2 days. Some itching. More VD after intercourse. Caffeine:  1 coffee per day  Moved this weekend, more le sweling b/l    Prenatal Visit    There were no vitals filed for this visit.   See PN flowsheet for exam    Objective:     General: alert, cooperative, no distress   Mental  status: mental status: alert, oriented to person, place, and time, normal mood, behavior, speech, dress, motor activity, and thought processes   Resp: resp: normal effort and no respiratory distress   Neuro: neuro: no gross deficits   Skin: skin: no discoloration or lesions of concern on visible areas   Due to this being a TeleHealth evaluation, many elements of the physical examination are unable to be assessed. 31 y.o.  33w5d   Encounter Diagnosis   Name Primary?  Prenatal care of primigravida, antepartum Yes       This patient was seen virtually during the COVID-19 pandemic. Please note some clinical care decisions may be influenced because of the current outbreak. Disc typical VD in pregnancy  Reviewed [facility] precautions  Consider OV if NI, persistent sx, otherwise keep FOB/US  rec preregistration     [] SAB/bleeding precautions reviewed   [] PTL/PPROM precautions reviewed   [] Labor precautions reviewed   [] Fetal kick counts discussed   [] Labs reviewed with patient   [] [place] precautions reviewed   [] Consent reviewed   [] Handouts given to pt   [] Glucola handout    [] GBS/labor/Magic Hour handout   []    [] We reviewed CDC recommendations for Tdap for patient and close contacts and RBA of receiving in pregnancy, advised obtaining in third trimester   [] Reviewed healthy nutrition in pregnancy and good exercise practices   [] We disc safer medications in pregnancy and referred patient to [facility] YOHAN resources   [] We reviewed CDC recommendations for flu vaccine and RBA of receiving in pregnancy   []    []    []           No orders of the defined types were placed in this encounter.       Crystal Campos MD

## 2020-05-11 NOTE — PATIENT INSTRUCTIONS

## 2020-05-11 NOTE — PATIENT INSTRUCTIONS
Weeks 32 to 34 of Your Pregnancy: Care Instructions  Your Care Instructions    During the last few weeks of your pregnancy, you may have more aches and pains. It's important to rest when you can. Your growing baby is putting more pressure on your bladder. So you may need to urinate more often. Hemorrhoids are also common. These are painful, itchy veins in the rectal area. In the 36th week, most women have a test for group B streptococcus (GBS). GBS is a common bacteria that can live in the vagina and rectum. It can make your baby sick after birth. If you test positive, you will get antibiotics during labor. These will keep your baby from getting the bacteria. You may want to talk with your doctor about banking your baby's umbilical cord blood. This is the blood left in the cord after birth. If you want to save this blood, you must arrange it ahead of time. You can't decide at the last minute. If you haven't already had the Tdap shot during this pregnancy, talk to your doctor about getting it. It will help protect your  against pertussis infection. Follow-up care is a key part of your treatment and safety. Be sure to make and go to all appointments, and call your doctor if you are having problems. It's also a good idea to know your test results and keep a list of the medicines you take. How can you care for yourself at home? Ease hemorrhoids  · Get more liquids, fruits, vegetables, and fiber in your diet. This will help keep your stools soft. · Avoid sitting for too long. Lie on your left side several times a day. · Clean yourself with soft, moist toilet paper. Or you can use witch hazel pads or personal hygiene pads. · If you are uncomfortable, try ice packs. Or you can sit in a warm sitz bath. Do these for 20 minutes at a time, as needed. · Use hydrocortisone cream for pain and itching. Two examples are Anusol and Preparation H Hydrocortisone.   · Ask your doctor about taking an over-the-counter stool softener. Consider breastfeeding  · Experts recommend that women breastfeed for 1 year or longer. Breast milk is the perfect food for babies. · Breast milk is easier for babies to digest than formula. And it is always available, just the right temperature, and free. · Breast milk may help protect your child from some health problems.  babies are less likely than formula-fed babies to:  ? Get ear infections, colds, diarrhea, and pneumonia. ? Be obese or get diabetes later in life. · Women who breastfeed have less bleeding after the birth. Their uteruses also shrink back faster. · Some women who breastfeed lose weight faster. Making milk burns calories. · Breastfeeding can lower your risk of breast cancer, ovarian cancer, and osteoporosis. Decide about circumcision for boys  · As you make this decision, it may help to think about your personal, Rastafarian, and family traditions. You get to decide if you will keep your son's penis natural or if he will be circumcised. · If you decide that you would like to have your baby circumcised, talk with your doctor. You can share your concerns about pain. And you can discuss your preferences for anesthesia. Where can you learn more? Go to http://erica-joseph.info/  Enter X711 in the search box to learn more about \"Weeks 32 to 34 of Your Pregnancy: Care Instructions. \"  Current as of: May 29, 2019Content Version: 12.4  © 2052-1057 Healthwise, Incorporated. Care instructions adapted under license by [employer] (which disclaims liability or warranty for this information). If you have questions about a medical condition or this instruction, always ask your healthcare professional. [place] [address] any warranty or liability for your use of this information.

## 2020-05-22 ENCOUNTER — TELEPHONE (OUTPATIENT)
Dept: OBGYN CLINIC | Age: 30
End: 2020-05-22

## 2020-05-22 NOTE — TELEPHONE ENCOUNTER
Call received at [phone]    31year old patient   35w2d pregnant patient last seen in the office on 2020. Cameron calling to confirm that order for breast pump has been received. .   This nurse was not able to confirm and provided the office fax number to refax the order.

## 2020-05-26 NOTE — PATIENT INSTRUCTIONS
Weeks 34 to 36 of Your Pregnancy: Care Instructions Your Care Instructions By now, your baby and your belly have grown quite large. It is almost time to give birth. Your baby's lungs are almost ready to breathe air. The bones in your baby's head are now firm enough to protect it, but soft enough to move down through the birth canal. 
You may feel excited, happy, anxious, or scared. You may wonder how you will know if you are in labor or what to expect during labor. Try to be flexible in your expectations of the birth. Because each birth is different, there is no way to know exactly what childbirth will be like for you. This care sheet will help you know what to expect and how to prepare. This may make your childbirth easier. If you haven't already had the Tdap shot during this pregnancy, talk to your doctor about getting it. It will help protect your  against pertussis infection. In the 36th week, most women have a test for group B streptococcus (GBS). GBS is a common bacteria that can live in the vagina and rectum. It can make your baby sick after birth. If you test positive, you will get antibiotics during labor. The medicine will keep your baby from getting the bacteria. Follow-up care is a key part of your treatment and safety. Be sure to make and go to all appointments, and call your doctor if you are having problems. It's also a good idea to know your test results and keep a list of the medicines you take. How can you care for yourself at home? Learn about pain relief choices · Pain is different for every woman. Talk with your doctor about your feelings about pain. · You can choose from several types of pain relief. These include medicine or breathing techniques, as well as comfort measures. You can use more than one option. · If you choose to have pain medicine during labor, talk to your doctor about your options.  Some medicines lower anxiety and help with some of the pain. Others make your lower body numb so that you won't feel pain. · Be sure to tell your doctor about your pain medicine choice before you start labor or very early in your labor. You may be able to change your mind as labor progresses. · Rarely, a woman is put to sleep by medicine given through a mask or an IV. Labor and delivery · The first stage of labor has three parts: early, active, and transition. ? Most women have early labor at home. You can stay busy or rest, eat light snacks, drink clear fluids, and start counting contractions. ? When talking during a contraction gets hard, you may be moving to active labor. During active labor, you should head for the hospital if you are not there already. ? You are in active labor when contractions come every 3 to 4 minutes and last about 60 seconds. Your cervix is opening more rapidly. ? If your water breaks, contractions will come faster and stronger. ? During transition, your cervix is stretching, and contractions are coming more rapidly. ? You may want to push, but your cervix might not be ready. Your doctor will tell you when to push. · The second stage starts when your cervix is completely opened and you are ready to push. ? Contractions are very strong to push the baby down the birth canal. 
? You will feel the urge to push. You may feel like you need to have a bowel movement. ? You may be coached to push with contractions. These contractions will be very strong, but you will not have them as often. You can get a little rest between contractions. ? You may be emotional and irritable. You may not be aware of what is going on around you. 
? One last push, and your baby is born. · The third stage is when a few more contractions push out the placenta. This may take 30 minutes or less. · The fourth stage is the welcome recovery. You may feel overwhelmed with emotions and exhausted but alert. This is a good time to start breastfeeding. Where can you learn more? Go to http://erica-joseph.info/ Enter W731 in the search box to learn more about \"Weeks 34 to 36 of Your Pregnancy: Care Instructions. \" Current as of: May 29, 2019Content Version: 12.4 © 5147-8422 Healthwise, [employer]orporated. Care instructions adapted under license by [employer] (which disclaims liability or warranty for this information). If you have questions about a medical condition or this instruction, always ask your healthcare professional. [place] [address] any warranty or liability for your use of this information. Week 37 of Your Pregnancy: Care Instructions Your Care Instructions You are near the end of your pregnancyand you're probably pretty uncomfortable. It may be harder to walk around. Lying down probably isn't comfortable either. You may have trouble getting to sleep or staying asleep. Most women deliver their babies between 40 and 41 weeks. This is a good time to think about packing a bag for the hospital with items you'll need. Then you'll be ready when labor starts. Follow-up care is a key part of your treatment and safety. Be sure to make and go to all appointments, and call your doctor if you are having problems. It's also a good idea to know your test results and keep a list of the medicines you take. How can you care for yourself at home? Learn about breastfeeding · Breastfeeding is best for your baby and good for you. · Breast milk has antibodies to help your baby fight infections. · Mothers who breastfeed often lose weight faster, because making milk burns calories. · Learning the best ways to hold your baby will make breastfeeding easier. · Let your partner bathe and diaper the baby to keep your partner from feeling left out. Snuggle together when you breastfeed. · You may want to learn how to use a breast pump and store your milk. · If you choose to bottle feed, make the feeding feel like breastfeeding so you can bond with your baby. Always hold your baby and the bottle. Do not prop bottles or let your baby fall asleep with a bottle. Learn about crying · It is common for babies to cry for 1 to 3 hours a day. Some cry more, some cry less. · Babies don't cry to make you upset or because you are a bad parent. · Crying is how your baby communicates. Your baby may be hungry; have gas; need a diaper change; or feel cold, warm, tired, lonely, or tense. Sometimes babies cry for unknown reasons. · If you respond to your baby's needs, he or she will learn to trust you. · Try to stay calm when your baby cries. Your baby may get more upset if he or she senses that you are upset. Know how to care for your  · Your baby's umbilical cord stump will drop off on its own, usually between 1 and 2 weeks. To care for your baby's umbilical cord area: ? Clean the area at the bottom of the cord 2 or 3 times a day. ? Pay special attention to the area where the cord attaches to the skin. ? Keep the diaper folded below the cord. ? Use a damp washcloth or cotton ball to sponge bathe your baby until the stump has come off. · Your baby's first dark stool is called meconium. After the meconium is passed, your baby will develop his or her own bowel pattern. ? Some babies, especially  babies, have several bowel movements a day. Others have one or two a day, or one every 2 to 3 days. ?  babies often have loose, yellow stools. Formula-fed babies have more formed stools. ? If your baby's stools look like little pellets, he or she is constipated. After 2 days of constipation, call your baby's doctor. · If your baby will be circumcised, you can care for him at home. ? Gently rinse his penis with warm water after every diaper change. Do not try to remove the film that forms on the penis. This film will go away on its own. Pat dry. ? Put petroleum ointment, such as Vaseline, on the area of the diaper that will touch your baby's penis. This will keep the diaper from sticking to your baby. ? Ask the doctor about giving your baby acetaminophen (Tylenol) for pain. Where can you learn more? Go to http://erica-joseph.info/ Enter 68 [id number] in the search box to learn more about \"Week 37 of Your Pregnancy: Care Instructions. \" Current as of: May 29, 2019Content Version: 12.4 © 8651-3501 [employer]. Care instructions adapted under license by [employer] (which disclaims liability or warranty for this information). If you have questions about a medical condition or this instruction, always ask your healthcare professional. [address] any warranty or liability for your use of this information. Group B Strep During Pregnancy: Care Instructions Your Care Instructions Group B strep infection is caused by a type of bacteria. It's a different kind of bacteria than the kind that causes strep throat. You may have this kind of bacteria in your body. Sometimes it may cause an infection, but most of the time it doesn't make you sick or cause symptoms. But if you pass the bacteria to your baby during the birth, it can cause serious health problems for your baby. If you have this bacteria in your body, you will get antibiotics when you are in labor. Antibiotics help prevent problems for a  baby. After birth, doctors will watch and may test your baby. If your baby tests positive for Group B strep, he or she will get antibiotics. If you plan to breastfeed your baby, don't worry. It will be safe to breastfeed. Follow-up care is a key part of your treatment and safety. Be sure to make and go to all appointments, and call your doctor if you are having problems. It's also a good idea to know your test results and keep a list of the medicines you take. How can you care for yourself at home? · If your doctor has prescribed antibiotics, take them as directed. Do not stop taking them just because you feel better. You need to take the full course of antibiotics. · Tell your doctor if you are allergic to any antibiotic. · If your water breaks, go to the hospital right away. Your doctor will give you antibiotics to help protect your baby from infection. · Tell the doctors and nurses at the hospital that you tested positive for group B strep. When should you call for help? Call your doctor now or seek immediate medical care if: 
  · You have symptoms of a urinary tract infection. These may include: 
? Pain or burning when you urinate. ? A frequent need to urinate without being able to pass much urine. ? Pain in the flank, which is just below the rib cage and above the waist on either side of the back. ? Blood in your urine. ? A fever.  
  · You think your water has broken.  
  · You have pain in your belly or pelvis.  
 Watch closely for changes in your health, and be sure to contact your doctor if you have any problems. Where can you learn more? Go to http://erica-joseph.info/ Enter M001 in the search box to learn more about \"Group B Strep During Pregnancy: Care Instructions. \" Current as of: May 29, 2019Content Version: 12.4 © 0480-8244 Healthwise, Incorporated. Care instructions adapted under license by [employer] (which disclaims liability or warranty for this information). If you have questions about a medical condition or this instruction, always ask your healthcare professional. [address] any warranty or liability for your use of this information.

## 2020-05-27 ENCOUNTER — HOSPITAL ENCOUNTER (INPATIENT)
Age: 30
LOS: 3 days | Discharge: HOME | End: 2020-05-30
Attending: OBSTETRICS & GYNECOLOGY | Admitting: OBSTETRICS & GYNECOLOGY
Payer: COMMERCIAL

## 2020-05-27 ENCOUNTER — ROUTINE PRENATAL (OUTPATIENT)
Dept: OBGYN CLINIC | Age: 30
End: 2020-05-27

## 2020-05-27 VITALS — DIASTOLIC BLOOD PRESSURE: 92 MMHG | SYSTOLIC BLOOD PRESSURE: 132 MMHG | BODY MASS INDEX: 30.83 KG/M2 | WEIGHT: 191 LBS

## 2020-05-27 DIAGNOSIS — O13.9 GESTATIONAL HYPERTENSION, ANTEPARTUM: ICD-10-CM

## 2020-05-27 DIAGNOSIS — O40.9XX0 POLYHYDRAMNIOS, ANTEPARTUM, SINGLE OR UNSPECIFIED FETUS: ICD-10-CM

## 2020-05-27 DIAGNOSIS — Z34.00 ENCOUNTER FOR SUPERVISION PREGNANCY IN PRIMIGRAVIDA, ANTEPARTUM: Primary | ICD-10-CM

## 2020-05-27 PROBLEM — Z34.90 PREGNANCY: Status: ACTIVE | Noted: 2020-05-27

## 2020-05-27 PROBLEM — O14.90 PREECLAMPSIA: Status: ACTIVE | Noted: 2020-05-27

## 2020-05-27 LAB
ALBUMIN SERPL-MCNC: 1.8 G/DL (ref 3.5–5)
ALBUMIN/GLOB SERPL: 0.6 {RATIO} (ref 1.1–2.2)
ALP SERPL-CCNC: 180 U/L (ref 45–117)
ALT SERPL-CCNC: 31 U/L (ref 12–78)
ANION GAP SERPL CALC-SCNC: 5 MMOL/L (ref 5–15)
AST SERPL-CCNC: 30 U/L (ref 15–37)
BASOPHILS # BLD: 0 K/UL (ref 0–0.1)
BASOPHILS NFR BLD: 0 % (ref 0–1)
BILIRUB SERPL-MCNC: 0.1 MG/DL (ref 0.2–1)
BUN SERPL-MCNC: 20 MG/DL (ref 6–20)
BUN/CREAT SERPL: 21 (ref 12–20)
CALCIUM SERPL-MCNC: 7.7 MG/DL (ref 8.5–10.1)
CHLORIDE SERPL-SCNC: 108 MMOL/L (ref 97–108)
CO2 SERPL-SCNC: 22 MMOL/L (ref 21–32)
CREAT SERPL-MCNC: 0.95 MG/DL (ref 0.55–1.02)
CREAT UR-MCNC: 52 MG/DL
DIFFERENTIAL METHOD BLD: NORMAL
EOSINOPHIL # BLD: 0.1 K/UL (ref 0–0.4)
EOSINOPHIL NFR BLD: 1 % (ref 0–7)
ERYTHROCYTE [DISTWIDTH] IN BLOOD BY AUTOMATED COUNT: 12.8 % (ref 11.5–14.5)
GLOBULIN SER CALC-MCNC: 3.2 G/DL (ref 2–4)
GLUCOSE SERPL-MCNC: 85 MG/DL (ref 65–100)
HCT VFR BLD AUTO: 43.5 % (ref 35–47)
HGB BLD-MCNC: 14.7 G/DL (ref 11.5–16)
IMM GRANULOCYTES # BLD AUTO: 0 K/UL (ref 0–0.04)
IMM GRANULOCYTES NFR BLD AUTO: 0 % (ref 0–0.5)
LYMPHOCYTES # BLD: 2.1 K/UL (ref 0.8–3.5)
LYMPHOCYTES NFR BLD: 21 % (ref 12–49)
MCH RBC QN AUTO: 30 PG (ref 26–34)
MCHC RBC AUTO-ENTMCNC: 33.8 G/DL (ref 30–36.5)
MCV RBC AUTO: 88.8 FL (ref 80–99)
MONOCYTES # BLD: 0.7 K/UL (ref 0–1)
MONOCYTES NFR BLD: 7 % (ref 5–13)
NEUTS SEG # BLD: 7 K/UL (ref 1.8–8)
NEUTS SEG NFR BLD: 71 % (ref 32–75)
NRBC # BLD: 0 K/UL (ref 0–0.01)
NRBC BLD-RTO: 0 PER 100 WBC
PLATELET # BLD AUTO: 170 K/UL (ref 150–400)
PMV BLD AUTO: 12.3 FL (ref 8.9–12.9)
POTASSIUM SERPL-SCNC: 4.3 MMOL/L (ref 3.5–5.1)
PROT SERPL-MCNC: 5 G/DL (ref 6.4–8.2)
PROT UR-MCNC: 421 MG/DL (ref 0–11.9)
PROT/CREAT UR-RTO: 8.1
RBC # BLD AUTO: 4.9 M/UL (ref 3.8–5.2)
SARS-COV-2, COV2: NOT DETECTED
SODIUM SERPL-SCNC: 135 MMOL/L (ref 136–145)
SOURCE, COVRS: NORMAL
SPECIMEN SOURCE, FCOV2M: NORMAL
WBC # BLD AUTO: 10 K/UL (ref 3.6–11)

## 2020-05-27 PROCEDURE — 65270000029 HC RM PRIVATE

## 2020-05-27 PROCEDURE — 74011000258 HC RX REV CODE- 258: Performed by: OBSTETRICS & GYNECOLOGY

## 2020-05-27 PROCEDURE — 80053 COMPREHEN METABOLIC PANEL: CPT

## 2020-05-27 PROCEDURE — 99283 EMERGENCY DEPT VISIT LOW MDM: CPT

## 2020-05-27 PROCEDURE — 75410000000 HC DELIVERY VAGINAL/SINGLE: Performed by: OBSTETRICS & GYNECOLOGY

## 2020-05-27 PROCEDURE — 74011250637 HC RX REV CODE- 250/637: Performed by: OBSTETRICS & GYNECOLOGY

## 2020-05-27 PROCEDURE — 74011250636 HC RX REV CODE- 250/636

## 2020-05-27 PROCEDURE — 85025 COMPLETE CBC W/AUTO DIFF WBC: CPT

## 2020-05-27 PROCEDURE — 36415 COLL VENOUS BLD VENIPUNCTURE: CPT

## 2020-05-27 PROCEDURE — 87635 SARS-COV-2 COVID-19 AMP PRB: CPT

## 2020-05-27 PROCEDURE — 75410000002 HC LABOR FEE PER 1 HR: Performed by: OBSTETRICS & GYNECOLOGY

## 2020-05-27 PROCEDURE — 84156 ASSAY OF PROTEIN URINE: CPT

## 2020-05-27 PROCEDURE — 3E033VJ INTRODUCTION OF OTHER HORMONE INTO PERIPHERAL VEIN, PERCUTANEOUS APPROACH: ICD-10-PCS | Performed by: OBSTETRICS & GYNECOLOGY

## 2020-05-27 PROCEDURE — 76060000078 HC EPIDURAL ANESTHESIA: Performed by: ANESTHESIOLOGY

## 2020-05-27 PROCEDURE — 75410000003 HC RECOV DEL/VAG/CSECN EA 0.5 HR: Performed by: OBSTETRICS & GYNECOLOGY

## 2020-05-27 PROCEDURE — 59200 INSERT CERVICAL DILATOR: CPT | Performed by: OBSTETRICS & GYNECOLOGY

## 2020-05-27 PROCEDURE — 74011250636 HC RX REV CODE- 250/636: Performed by: OBSTETRICS & GYNECOLOGY

## 2020-05-27 RX ORDER — BUTALBITAL, ACETAMINOPHEN AND CAFFEINE 50; 325; 40 MG/1; MG/1; MG/1
2 TABLET ORAL ONCE
Status: COMPLETED | OUTPATIENT
Start: 2020-05-27 | End: 2020-05-27

## 2020-05-27 RX ORDER — SODIUM CHLORIDE 0.9 % (FLUSH) 0.9 %
5-40 SYRINGE (ML) INJECTION AS NEEDED
Status: DISCONTINUED | OUTPATIENT
Start: 2020-05-27 | End: 2020-05-29

## 2020-05-27 RX ORDER — ACETAMINOPHEN 325 MG/1
650 TABLET ORAL
Status: DISCONTINUED | OUTPATIENT
Start: 2020-05-27 | End: 2020-05-28 | Stop reason: SDUPTHER

## 2020-05-27 RX ORDER — SODIUM CHLORIDE 0.9 % (FLUSH) 0.9 %
5-40 SYRINGE (ML) INJECTION AS NEEDED
Status: DISCONTINUED | OUTPATIENT
Start: 2020-05-27 | End: 2020-05-29 | Stop reason: HOSPADM

## 2020-05-27 RX ORDER — MAGNESIUM SULFATE HEPTAHYDRATE 40 MG/ML
4 INJECTION, SOLUTION INTRAVENOUS ONCE
Status: COMPLETED | OUTPATIENT
Start: 2020-05-27 | End: 2020-05-27

## 2020-05-27 RX ORDER — OXYTOCIN/0.9 % SODIUM CHLORIDE 30/500 ML
0-20 PLASTIC BAG, INJECTION (ML) INTRAVENOUS
Status: DISCONTINUED | OUTPATIENT
Start: 2020-05-28 | End: 2020-05-29

## 2020-05-27 RX ORDER — DIPHENHYDRAMINE HCL 25 MG
50 CAPSULE ORAL
Status: DISCONTINUED | OUTPATIENT
Start: 2020-05-27 | End: 2020-05-30 | Stop reason: HOSPADM

## 2020-05-27 RX ORDER — SODIUM CHLORIDE 0.9 % (FLUSH) 0.9 %
5-40 SYRINGE (ML) INJECTION EVERY 8 HOURS
Status: DISCONTINUED | OUTPATIENT
Start: 2020-05-27 | End: 2020-05-29 | Stop reason: HOSPADM

## 2020-05-27 RX ORDER — SODIUM CHLORIDE 0.9 % (FLUSH) 0.9 %
5-40 SYRINGE (ML) INJECTION EVERY 8 HOURS
Status: DISCONTINUED | OUTPATIENT
Start: 2020-05-27 | End: 2020-05-29

## 2020-05-27 RX ORDER — LABETALOL HCL 20 MG/4 ML
SYRINGE (ML) INTRAVENOUS
Status: COMPLETED
Start: 2020-05-27 | End: 2020-05-27

## 2020-05-27 RX ORDER — NALOXONE HYDROCHLORIDE 0.4 MG/ML
0.4 INJECTION, SOLUTION INTRAMUSCULAR; INTRAVENOUS; SUBCUTANEOUS AS NEEDED
Status: DISCONTINUED | OUTPATIENT
Start: 2020-05-27 | End: 2020-05-28 | Stop reason: SDUPTHER

## 2020-05-27 RX ORDER — CALCIUM GLUCONATE 20 MG/ML
1 INJECTION, SOLUTION INTRAVENOUS AS NEEDED
Status: DISCONTINUED | OUTPATIENT
Start: 2020-05-27 | End: 2020-05-30 | Stop reason: HOSPADM

## 2020-05-27 RX ORDER — MAGNESIUM SULFATE HEPTAHYDRATE 40 MG/ML
2 INJECTION, SOLUTION INTRAVENOUS CONTINUOUS
Status: DISCONTINUED | OUTPATIENT
Start: 2020-05-27 | End: 2020-05-30

## 2020-05-27 RX ORDER — LABETALOL HCL 20 MG/4 ML
80 SYRINGE (ML) INTRAVENOUS
Status: COMPLETED | OUTPATIENT
Start: 2020-05-27 | End: 2020-05-27

## 2020-05-27 RX ORDER — MAG HYDROX/ALUMINUM HYD/SIMETH 200-200-20
30 SUSPENSION, ORAL (FINAL DOSE FORM) ORAL
Status: DISCONTINUED | OUTPATIENT
Start: 2020-05-27 | End: 2020-05-29 | Stop reason: HOSPADM

## 2020-05-27 RX ORDER — SODIUM CHLORIDE, SODIUM LACTATE, POTASSIUM CHLORIDE, CALCIUM CHLORIDE 600; 310; 30; 20 MG/100ML; MG/100ML; MG/100ML; MG/100ML
125 INJECTION, SOLUTION INTRAVENOUS CONTINUOUS
Status: DISCONTINUED | OUTPATIENT
Start: 2020-05-27 | End: 2020-05-29 | Stop reason: HOSPADM

## 2020-05-27 RX ORDER — ACETAMINOPHEN 325 MG/1
650 TABLET ORAL
Status: DISCONTINUED | OUTPATIENT
Start: 2020-05-27 | End: 2020-05-29

## 2020-05-27 RX ORDER — LABETALOL HCL 20 MG/4 ML
40 SYRINGE (ML) INTRAVENOUS
Status: COMPLETED | OUTPATIENT
Start: 2020-05-27 | End: 2020-05-27

## 2020-05-27 RX ORDER — SODIUM CHLORIDE 0.9 % (FLUSH) 0.9 %
5-10 SYRINGE (ML) INJECTION EVERY 8 HOURS
Status: DISCONTINUED | OUTPATIENT
Start: 2020-05-27 | End: 2020-05-29

## 2020-05-27 RX ORDER — FAMOTIDINE 20 MG/1
20 TABLET, FILM COATED ORAL 2 TIMES DAILY
Status: DISCONTINUED | OUTPATIENT
Start: 2020-05-27 | End: 2020-05-30 | Stop reason: HOSPADM

## 2020-05-27 RX ORDER — ONDANSETRON 4 MG/1
4 TABLET, ORALLY DISINTEGRATING ORAL
Status: DISCONTINUED | OUTPATIENT
Start: 2020-05-27 | End: 2020-05-29

## 2020-05-27 RX ORDER — HYDRALAZINE HYDROCHLORIDE 20 MG/ML
10 INJECTION INTRAMUSCULAR; INTRAVENOUS
Status: COMPLETED | OUTPATIENT
Start: 2020-05-27 | End: 2020-05-27

## 2020-05-27 RX ORDER — SODIUM CHLORIDE, SODIUM LACTATE, POTASSIUM CHLORIDE, CALCIUM CHLORIDE 600; 310; 30; 20 MG/100ML; MG/100ML; MG/100ML; MG/100ML
125 INJECTION, SOLUTION INTRAVENOUS CONTINUOUS
Status: DISCONTINUED | OUTPATIENT
Start: 2020-05-27 | End: 2020-05-30

## 2020-05-27 RX ORDER — FAMOTIDINE 20 MG/1
20 TABLET, FILM COATED ORAL DAILY
Status: DISCONTINUED | OUTPATIENT
Start: 2020-05-28 | End: 2020-05-27

## 2020-05-27 RX ORDER — LABETALOL HCL 20 MG/4 ML
20 SYRINGE (ML) INTRAVENOUS ONCE
Status: COMPLETED | OUTPATIENT
Start: 2020-05-27 | End: 2020-05-27

## 2020-05-27 RX ORDER — OXYTOCIN/0.9 % SODIUM CHLORIDE 30/500 ML
0-25 PLASTIC BAG, INJECTION (ML) INTRAVENOUS
Status: DISCONTINUED | OUTPATIENT
Start: 2020-05-28 | End: 2020-05-29

## 2020-05-27 RX ORDER — LABETALOL HCL 20 MG/4 ML
SYRINGE (ML) INTRAVENOUS
Status: DISPENSED
Start: 2020-05-27 | End: 2020-05-28

## 2020-05-27 RX ORDER — FAMOTIDINE 20 MG/1
20 TABLET, FILM COATED ORAL DAILY
Status: DISCONTINUED | OUTPATIENT
Start: 2020-05-27 | End: 2020-05-27

## 2020-05-27 RX ORDER — BUTORPHANOL TARTRATE 2 MG/ML
1 INJECTION INTRAMUSCULAR; INTRAVENOUS
Status: DISCONTINUED | OUTPATIENT
Start: 2020-05-27 | End: 2020-05-29 | Stop reason: HOSPADM

## 2020-05-27 RX ADMIN — SODIUM CHLORIDE, SODIUM LACTATE, POTASSIUM CHLORIDE, AND CALCIUM CHLORIDE 999 ML/HR: 600; 310; 30; 20 INJECTION, SOLUTION INTRAVENOUS at 15:00

## 2020-05-27 RX ADMIN — LABETALOL HYDROCHLORIDE 40 MG: 5 INJECTION, SOLUTION INTRAVENOUS at 12:14

## 2020-05-27 RX ADMIN — HYDRALAZINE HYDROCHLORIDE 10 MG: 20 INJECTION INTRAMUSCULAR; INTRAVENOUS at 14:06

## 2020-05-27 RX ADMIN — SODIUM CHLORIDE, SODIUM LACTATE, POTASSIUM CHLORIDE, AND CALCIUM CHLORIDE 75 ML/HR: 600; 310; 30; 20 INJECTION, SOLUTION INTRAVENOUS at 15:06

## 2020-05-27 RX ADMIN — Medication 40 MG: at 12:14

## 2020-05-27 RX ADMIN — ACETAMINOPHEN 650 MG: 325 TABLET ORAL at 16:45

## 2020-05-27 RX ADMIN — DIPHENHYDRAMINE HYDROCHLORIDE 50 MG: 25 CAPSULE ORAL at 22:35

## 2020-05-27 RX ADMIN — FAMOTIDINE 20 MG: 20 TABLET, FILM COATED ORAL at 12:49

## 2020-05-27 RX ADMIN — MAGNESIUM SULFATE HEPTAHYDRATE 4 G: 40 INJECTION, SOLUTION INTRAVENOUS at 14:37

## 2020-05-27 RX ADMIN — SODIUM CHLORIDE 5 MILLION UNITS: 900 INJECTION, SOLUTION INTRAVENOUS at 19:31

## 2020-05-27 RX ADMIN — SODIUM CHLORIDE, SODIUM LACTATE, POTASSIUM CHLORIDE, AND CALCIUM CHLORIDE 75 ML/HR: 600; 310; 30; 20 INJECTION, SOLUTION INTRAVENOUS at 14:37

## 2020-05-27 RX ADMIN — LABETALOL HYDROCHLORIDE 20 MG: 5 INJECTION, SOLUTION INTRAVENOUS at 11:54

## 2020-05-27 RX ADMIN — MAGNESIUM SULFATE HEPTAHYDRATE 2 G/HR: 40 INJECTION, SOLUTION INTRAVENOUS at 15:07

## 2020-05-27 RX ADMIN — Medication 20 MG: at 11:54

## 2020-05-27 RX ADMIN — FAMOTIDINE 20 MG: 20 TABLET, FILM COATED ORAL at 22:35

## 2020-05-27 RX ADMIN — BUTALBITAL, ACETAMINOPHEN, AND CAFFEINE 2 TABLET: 50; 325; 40 TABLET ORAL at 19:52

## 2020-05-27 RX ADMIN — LABETALOL HYDROCHLORIDE 80 MG: 5 INJECTION, SOLUTION INTRAVENOUS at 12:45

## 2020-05-27 RX ADMIN — SODIUM CHLORIDE 2.5 MILLION UNITS: 900 INJECTION, SOLUTION INTRAVENOUS at 22:36

## 2020-05-27 NOTE — PROGRESS NOTES
ITSP for fetal decel related to low BP  ~30min after IV hydralazine. Visit Vitals  /59   Pulse 75   Temp 98.5 °F (36.9 °C)   Resp 16   SpO2 97%   Breastfeeding Yes     gen pt on side, comfortable      31 y.o.  36w0d  Severe PIH  Hypotension with fetal decel resolved    NST Inpatient Procedure Note    Julionikamran Cramp presents for fetal non-stress test.    Indication is preeclampsia. She is 36w0d. She has been monitored for more than 60 minutes. NST Interpretation:   FHR baseline 130 bpm,   Variability moderate  Decelerations variable to 60s x 1-3 min (tracing lost for ~90 seconds)   spont recovery to 120s, good mely. Uterine contractions were not present  Assessment  NST is reactive. NST is reassuring, after resolution of decel   Patient does need admission/observation for further monitoring. [facility] was informed of the NST results and her questions were answered.     Plan:    [x] Continue admission to labor and delivery    [] Continue observation   [] Keep routine OB follow up upon discharge   [] Reviewed fetal movement kick counts, notify MD if decreased   []    []

## 2020-05-27 NOTE — PROGRESS NOTES
Anisa Burch MD, FACOG       Chart reviewed for the following:   I, Katheren Lennox, MD, have reviewed the pertinent history and updated the medications and allergic reactions for Emily Issa. TIME OUT performed immediately prior to start of procedure:   Brea Hadley MD, have performed the following reviews on Emily Issa prior to the start of the procedure:            * Patient was identified by name and date of birth   * Agreement on procedure being performed was verified  * Risks and Benefits explained to the patient  * Procedure site verified and marked as necessary  * Patient was positioned for comfort  * Consent was signed and verified     Time: 445pm    Date of procedure: 2020    Procedure performed by:  No name on file. Provider assisted by: Kennedi Wisdom RN RN    Patient assisted by: self    How tolerated by patient: tolerated the procedure well with no complications       Comments: none    I was present for the entire procedure and agree with the above attestation. Cristina Ochoa MD      Cervical Grayson insertion Procedure Note    Emily Issa is a , 31 y.o. 36w0d who presents today far placement of a grayson catheter in the cervix for ripening. Her cervix is unfavorable and she is scheduled for induction for preeclampsia. She has elected to have a cervical grayson catheter placement today. The risks, benefits and assets of the procedure were discussed. Her questions were answered. PROCEDURE: The grayson catheter was prepped with Betadine. Venora Mica catheter was placed through the cervix without difficulty. The uterine bulb was inflated with 80 cc of saline and the vaginal balloon was inflated to 60cc. Bleeding was absent. The patient's level of discomfort was minimal.    POST PROCEDURE: The patient tolerated the procedure well. There were no complications. We continued her admission. Received most recent mammogram, dexa scan, pap smear and fecal immunochemical test scanned into chart today.

## 2020-05-27 NOTE — PROGRESS NOTES
[facility]  [email or web address]  [phone]    Nell Lind MD, FACOG       [facility]   FOLLOW UP OB NOTE WITH ULTRASOUND    Chief Complaint   Patient presents with    Routine Prenatal Visit       The patient reports the following concerns: none    I discussed with the patient the limitations of ultrasound and that imaging can not rule out all birth defects, chromosomal problems, or other problems with the baby. rec mfm fu for polyp/htn  To [facility] for PIH labs/serial bp/nst    US findings:  LIMITED OB SCAN  A SINGLE VERTEX 36W0D IUP IS SEEN. FETAL CARDIAC MOTION OBSERVED. LIMITED ANATOMY WAS VISUALIZED AND APPEARS WNL. APPROPRIATE FETAL GROWTH IS SEEN. SIZE = DATES. AC < 5%. PLACENTA APPEAR WNL. SUSHMA APPEARS INCREASED AND MEASURES 26-27CM. Viola Chavarria      Physician review of ultrasound performed by technician    Today's ultrasound report and images were reviewed and discussed with the patient.   Please see images and imaging report entered by technician in PACS for more detail and progress note and diagnosis entered by MD.    Kunal Hernandez MD

## 2020-05-27 NOTE — PROGRESS NOTES
1915 Assumed care of pt at this time from [facility], Lobo Toussaint RN.    6462 Pt c/o continued headache despite taking tylenol, requesting more pain medication at this time. 80 Spoke to Dr. Alegre regarding pt's headache, order received for Fioricet. 2030 Dr. Alegre aware of pt's bps.    2220 Spoke to Dr. Alegre regarding pt's request for another pepcid, MD states pt may have another dose. Also discussed pt's bps. MD states to return to taking bp q1hr unless severe range. 2318 Bedside and Verbal shift change report given to CHRISSIE Lovett (oncoming nurse) by MALACHI Gunderson RN (offgoing nurse). Report included the following information SBAR, Kardex, Intake/Output, MAR, Recent Results and Med Rec Status.

## 2020-05-27 NOTE — PROGRESS NOTES
1154 - 20mg labetolol IV pushed per Dr Tuan Camarena telephone order taken from Charge Nurse, Tano Le RN. Pt denies any headache, dizziness,blurry vision or right epigastric pain at this time. Reactive NST. 1208 - Blood pressure still in severe range after 20mg labetolol given. Order placed for 40mg Labetolol IV now per hypertension protocol. 1214 - 40g IV labetolol given IV at this time. Dr Tuan Camarena at bedside evaluating pt status. MD discussing plan of care to stay overweight and continue to monitor blood pressures. Pt verbalized understanding. 18 - Dr Tuan Camarena called unit. MD notified of recent severe range blood pressures. 80mg IV labetolol given per protocol. New orders to start Magnesium sulfate at this time. 36 - Dr Tuan Camarena at bedside discussing plan for delivery. MD stated to allow pt to eat lunch and start magnesium after lunch. 1406-10mg Hydralizine given IV now per hypertension protocol. 1437 - Pt finished with lunch - ambulated to bathroom without difficulty. 1437 - 4g Mg Bolused started as ordered. 1455 - pt c/o dizziness and ringing in the ears. Pt hypotensive. Charge Nurse perfect served Dr Tuan Camarena new symptoms from pt. IV bolus initiated. Pt turned on left lateral in trendelberg position. 12 - Dr Tuan Camarena at bedside reevaluating pt status. Pt states she feels better now. 65 - Dr Tuan Camarena at bedside - placed cook catheter without difficulty - 80cc sterile water in uterine and 60cc sterile water placed in  Vaginal.  New orders to start pitocin at 4am and continue fetal monitoring at this time. 6715 - report given to Bairon Garza Rn in SBAR format at this time.

## 2020-05-27 NOTE — PROGRESS NOTES
1105: Pt arrived from Dr. Shannon Monsivais office; sent for increased BP's.   1110: Pt placed on efm at this time. In stable condition.     1129: Dr. Brigid velasquez served to call unit regarding pt BP's

## 2020-05-27 NOTE — H&P
History & Physical    Name: Deedee Piedra MRN: [id number]  SSN: [id number]    YOB: 1990  Age: 31 y.o. Sex: female        Subjective:     Estimated Date of Delivery: 20  OB History        1    Para        Term                AB        Living           SAB        TAB        Ectopic        Molar        Multiple        Live Births                    Ms. Aubrey Aguilar is admitted with pregnancy at 36w0d for elevated BP. Luda Jose Martin Prenatal course was complicated by abnormal one hour, polyhdramnions, AC lag. Please see prenatal records for details. Past Medical History:   Diagnosis Date    Essential hypertension     Gestational hypertension     Pap smear for cervical cancer screening 10/31/2019    Negative    PCOS (polycystic ovarian syndrome) 2018    Polycystic disease, ovaries      Past Surgical History:   Procedure Laterality Date    HX OTHER SURGICAL      wisdom teeth    HX WISDOM TEETH EXTRACTION  2017     Social History     Occupational History    Not on file   Tobacco Use    Smoking status: Never Smoker    Smokeless tobacco: Never Used   Substance and Sexual Activity    Alcohol use: Not Currently    Drug use: Never    Sexual activity: Yes     Partners: Male     Birth control/protection: None     Family History   Problem Relation Age of Onset    Endometriosis Mother     Other Mother         Abnormal uterine bleeding    No Known Problems Father     Breast Cancer Maternal Grandmother 52       No Known Allergies  Prior to Admission medications    Medication Sig Start Date End Date Taking? Authorizing Provider   famotidine (PEPCID PO) Take  by mouth. Yes Provider, Historical   prenatal vit,calc76/iron/folic (PNV 54-2 PO) Take  by mouth. Yes Provider, Historical   calcium carbonate (TUMS PO) Take  by mouth.     Provider, [address] [place] [address] Problems    Diagnosis Date Noted    Pregnancy 2020       Review of Systems: A comprehensive review of systems was negative except for that written in the HPI. Constitutional: negative for fevers, chills and weight loss  ENT ROS: negative for - hearing change, oral lesions or visual changes  Respiratory: negative for cough, wheezing or dyspnea on exertion  Cardiovascular: negative for chest pain, irregular heart beats, exertional chest pressure/discomfort  Gastrointestinal: negative for dysphagia, nausea and vomiting  Genito-Urinary ROS: see HPI  Inteument/breast: negative for rash, breast lump and nipple discharge  Musculoskeletal:negative for stiff joints, neck pain and muscle weakness  Endocrine ROS: negative for - breast changes, galactorrhea or temperature intolerance  Hematological and Lymphatic ROS: negative for - bruising or swollen lymph nodes          Objective:     Vitals:  Vitals:    05/27/20 1219 05/27/20 1222 05/27/20 1224 05/27/20 1228   BP:  (!) 157/106  (!) 155/108   Pulse:  77  83   Resp:       Temp:       SpO2: 97%  98%           Physical Exam:  Patient without distress.   Heart: Regular rate and rhythm or S1S2 present  Lung: clear to auscultation throughout lung fields, no wheezes, no rales, no rhonchi and normal respiratory effort  Abdomen: soft, nontender  Fundus: soft and non tender  Cervical Exam: 10/closed/post soft  Lower Extremities: no c/t/trace e    Membranes:  intact    Prenatal Labs:   Lab Results   Component Value Date/Time    Rubella, External 1.11 10/31/2019    HBsAg, External negative 10/31/2019    HIV, External non-reactive 10/31/2019        WBC   Date Value Ref Range Status   05/27/2020 10.0 3.6 - 11.0 K/uL Final     RBC   Date Value Ref Range Status   05/27/2020 4.90 3.80 - 5.20 M/uL Final     HGB   Date Value Ref Range Status   05/27/2020 14.7 11.5 - 16.0 g/dL Final     HCT   Date Value Ref Range Status   05/27/2020 43.5 35.0 - 47.0 % Final     PLATELET   Date Value Ref Range Status   05/27/2020 170 150 - 400 K/uL Final     Hgb, External   Date Value Ref Range Status   04/02/2020 13.1 Final     Hct, External   Date Value Ref Range Status   2020 41.3  Final     Platelet cnt., External   Date Value Ref Range Status   2020 168  Final     Recent Results (from the past 12 hour(s))   METABOLIC PANEL, COMPREHENSIVE    Collection Time: 20 11:18 AM   Result Value Ref Range    Sodium 135 (L) 136 - 145 mmol/L    Potassium 4.3 3.5 - 5.1 mmol/L    Chloride 108 97 - 108 mmol/L    CO2 22 21 - 32 mmol/L    Anion gap 5 5 - 15 mmol/L    Glucose 85 65 - 100 mg/dL    BUN 20 6 - 20 MG/DL    Creatinine 0.95 0.55 - 1.02 MG/DL    BUN/Creatinine ratio 21 (H) 12 - 20      GFR est AA >60 >60 ml/min/1.73m2    GFR est non-AA >60 >60 ml/min/1.73m2    Calcium 7.7 (L) 8.5 - 10.1 MG/DL    Bilirubin, total 0.1 (L) 0.2 - 1.0 MG/DL    ALT (SGPT) 31 12 - 78 U/L    AST (SGOT) 30 15 - 37 U/L    Alk. phosphatase 180 (H) 45 - 117 U/L    Protein, total 5.0 (L) 6.4 - 8.2 g/dL    Albumin 1.8 (L) 3.5 - 5.0 g/dL    Globulin 3.2 2.0 - 4.0 g/dL    A-G Ratio 0.6 (L) 1.1 - 2.2     CBC WITH AUTOMATED DIFF    Collection Time: 20 11:18 AM   Result Value Ref Range    WBC 10.0 3.6 - 11.0 K/uL    RBC 4.90 3.80 - 5.20 M/uL    HGB 14.7 11.5 - 16.0 g/dL    HCT 43.5 35.0 - 47.0 %    MCV 88.8 80.0 - 99.0 FL    MCH 30.0 26.0 - 34.0 PG    MCHC 33.8 30.0 - 36.5 g/dL    RDW 12.8 11.5 - 14.5 %    PLATELET 624 178 - 659 K/uL    MPV 12.3 8.9 - 12.9 FL    NRBC 0.0 0  WBC    ABSOLUTE NRBC 0.00 0.00 - 0.01 K/uL    NEUTROPHILS 71 32 - 75 %    LYMPHOCYTES 21 12 - 49 %    MONOCYTES 7 5 - 13 %    EOSINOPHILS 1 0 - 7 %    BASOPHILS 0 0 - 1 %    IMMATURE GRANULOCYTES 0 0.0 - 0.5 %    ABS. NEUTROPHILS 7.0 1.8 - 8.0 K/UL    ABS. LYMPHOCYTES 2.1 0.8 - 3.5 K/UL    ABS. MONOCYTES 0.7 0.0 - 1.0 K/UL    ABS. EOSINOPHILS 0.1 0.0 - 0.4 K/UL    ABS. BASOPHILS 0.0 0.0 - 0.1 K/UL    ABS. IMM.  GRANS. 0.0 0.00 - 0.04 K/UL    DF AUTOMATED             Assessment/Plan:   31 y.o.  36w0d  Preeclampsia with severe range BP requiring IV treatment, AC lag  The patient does not have anemia  GBS pending, 5/27/2020 collecteed  Reassuring fetal surveillance    Plan: Admit for preeclampsia   Acute blood pressure control. CEFM/TOCO  D/w MFM, Dr. Karthik Lopez: agree with plan for delivery, will discuss mode options with patient  Magnesium for seizure prophylaxis  Labetalol protocol, add hydralazine prn   COVID 19 screening  No steroids based on gestational age and COVID 23 pandemic      Signed By:  Dexter Avalos MD     May 27, 2020       NST Inpatient Procedure Note    Tess Wick presents for fetal non-stress test.    Indication is preeclampsia. She is 36w0d. She has been monitored for more than 60 minutes. The FHR was reactive. NST Interpretation:   FHR baseline 130 bpm,   Variability moderate  Accelerations present. Decelerations absent. Uterine contractions were not present     Assessment  NST is reactive. NST is reassuring. Patient does need admission/observation for further monitoring. Blake Bond was informed of the NST results and her questions were answered.     Plan:    [] Continue admission to labor and delivery    [] Continue observation   [] Keep routine OB follow up upon discharge   [] Reviewed fetal movement kick counts, notify MD if decreased   []    []

## 2020-05-27 NOTE — PROGRESS NOTES
5/27/2020  2:18 PM    CM met with MADIHA to complete initial assessment and complete discharge planning. MOB verified and confirmed demographics. MADIHA lives with spouse/FOB- [place] ([phone]),  at the address on file. MADIHA is employed and plans to take 8wks off from work. FOB is also employed and will be taking 2wks off. MADIHA reports she has good family support. MADIHA plans to breast feed baby and has pump to use at home. MADIHA plans to follow with [facility]. MADIHA has car seat, bassinet/crib, clothing, bottles and all necessary supplies for baby. MADIHA has [employer] [facility] [employer], and will be adding baby to this policy. CM discussed process to add baby to insurance, MOB verbalized understanding. MOB denied needing WIC/Medicaid services. Care Management Interventions  PCP Verified by CM: Yes(Dr. Kennedy)  Mode of Transport at Discharge:  Other (see comment)  Transition of Care Consult (CM Consult): Discharge Planning  Current Support Network: Lives with Spouse, Own Home, Family Lives Nearby  Confirm Follow Up Transport: Family  Discharge Location  Discharge Placement: Home with family assistance  Luis Miguel Lopez

## 2020-05-27 NOTE — PROGRESS NOTES
[facility] 
[email or web address] 
[phone] Sharri Dyer MD, FACOG  
 
 
[facility]  
FOLLOW UP OB NOTE WITH ULTRASOUND No chief complaint on file. The patient reports the following concerns: {None:17109::\"none\"} I discussed with the patient the limitations of ultrasound and that imaging can not rule out all birth defects, chromosomal problems, or other problems with the baby. US findings: LIMITED OB SCAN 
A SINGLE VERTEX 36W0D IUP IS SEEN. FETAL CARDIAC MOTION OBSERVED. LIMITED ANATOMY WAS VISUALIZED AND APPEARS WNL. APPROPRIATE FETAL GROWTH IS SEEN. SIZE = DATES. AC < 5%. PLACENTA APPEAR WNL. SUSHMA APPEARS INCREASED AND MEASURES 26-27CM. Physician review of ultrasound performed by technician Today's ultrasound report and images were reviewed and discussed with the patient.  
Please see images and imaging report entered by technician in PACS for more detail and progress note and diagnosis entered by MD. 
 
Eleanor Fajardo MD

## 2020-05-28 ENCOUNTER — ANESTHESIA EVENT (OUTPATIENT)
Dept: LABOR AND DELIVERY | Age: 30
End: 2020-05-28
Payer: COMMERCIAL

## 2020-05-28 ENCOUNTER — ANESTHESIA (OUTPATIENT)
Dept: LABOR AND DELIVERY | Age: 30
End: 2020-05-28
Payer: COMMERCIAL

## 2020-05-28 LAB
ANION GAP SERPL CALC-SCNC: 10 MMOL/L (ref 5–15)
BASOPHILS # BLD: 0 K/UL (ref 0–0.1)
BASOPHILS NFR BLD: 0 % (ref 0–1)
BUN SERPL-MCNC: 15 MG/DL (ref 6–20)
BUN/CREAT SERPL: 18 (ref 12–20)
CALCIUM SERPL-MCNC: 6.8 MG/DL (ref 8.5–10.1)
CHLORIDE SERPL-SCNC: 105 MMOL/L (ref 97–108)
CO2 SERPL-SCNC: 21 MMOL/L (ref 21–32)
CREAT SERPL-MCNC: 0.83 MG/DL (ref 0.55–1.02)
DIFFERENTIAL METHOD BLD: ABNORMAL
EOSINOPHIL # BLD: 0 K/UL (ref 0–0.4)
EOSINOPHIL NFR BLD: 0 % (ref 0–7)
ERYTHROCYTE [DISTWIDTH] IN BLOOD BY AUTOMATED COUNT: 13.4 % (ref 11.5–14.5)
GLUCOSE SERPL-MCNC: 73 MG/DL (ref 65–100)
HCT VFR BLD AUTO: 43.4 % (ref 35–47)
HGB BLD-MCNC: 14.7 G/DL (ref 11.5–16)
IMM GRANULOCYTES # BLD AUTO: 0 K/UL (ref 0–0.04)
IMM GRANULOCYTES NFR BLD AUTO: 0 % (ref 0–0.5)
LYMPHOCYTES # BLD: 1.7 K/UL (ref 0.8–3.5)
LYMPHOCYTES NFR BLD: 12 % (ref 12–49)
MCH RBC QN AUTO: 30.1 PG (ref 26–34)
MCHC RBC AUTO-ENTMCNC: 33.9 G/DL (ref 30–36.5)
MCV RBC AUTO: 88.8 FL (ref 80–99)
MONOCYTES # BLD: 0.7 K/UL (ref 0–1)
MONOCYTES NFR BLD: 5 % (ref 5–13)
NEUTS SEG # BLD: 12.5 K/UL (ref 1.8–8)
NEUTS SEG NFR BLD: 83 % (ref 32–75)
NRBC # BLD: 0 K/UL (ref 0–0.01)
NRBC BLD-RTO: 0 PER 100 WBC
PLATELET # BLD AUTO: 190 K/UL (ref 150–400)
PMV BLD AUTO: 12.5 FL (ref 8.9–12.9)
POTASSIUM SERPL-SCNC: 4.2 MMOL/L (ref 3.5–5.1)
RBC # BLD AUTO: 4.89 M/UL (ref 3.8–5.2)
SODIUM SERPL-SCNC: 136 MMOL/L (ref 136–145)
WBC # BLD AUTO: 14.9 K/UL (ref 3.6–11)

## 2020-05-28 PROCEDURE — 74011250637 HC RX REV CODE- 250/637: Performed by: OBSTETRICS & GYNECOLOGY

## 2020-05-28 PROCEDURE — 85025 COMPLETE CBC W/AUTO DIFF WBC: CPT

## 2020-05-28 PROCEDURE — 80048 BASIC METABOLIC PNL TOTAL CA: CPT

## 2020-05-28 PROCEDURE — 0DQR0ZZ REPAIR ANAL SPHINCTER, OPEN APPROACH: ICD-10-PCS | Performed by: OBSTETRICS & GYNECOLOGY

## 2020-05-28 PROCEDURE — 77030014125 HC TY EPDRL BBMI -B: Performed by: ANESTHESIOLOGY

## 2020-05-28 PROCEDURE — 77030005513 HC CATH URETH FOL11 MDII -B

## 2020-05-28 PROCEDURE — 74011000250 HC RX REV CODE- 250: Performed by: ANESTHESIOLOGY

## 2020-05-28 PROCEDURE — 0UQMXZZ REPAIR VULVA, EXTERNAL APPROACH: ICD-10-PCS | Performed by: OBSTETRICS & GYNECOLOGY

## 2020-05-28 PROCEDURE — 77030019905 HC CATH URETH INTMIT MDII -A

## 2020-05-28 PROCEDURE — 77030010848 HC CATH INTUTR PRSS KOLB -B

## 2020-05-28 PROCEDURE — 74011250636 HC RX REV CODE- 250/636: Performed by: OBSTETRICS & GYNECOLOGY

## 2020-05-28 PROCEDURE — 10907ZC DRAINAGE OF AMNIOTIC FLUID, THERAPEUTIC FROM PRODUCTS OF CONCEPTION, VIA NATURAL OR ARTIFICIAL OPENING: ICD-10-PCS | Performed by: OBSTETRICS & GYNECOLOGY

## 2020-05-28 PROCEDURE — 88307 TISSUE EXAM BY PATHOLOGIST: CPT

## 2020-05-28 PROCEDURE — 36415 COLL VENOUS BLD VENIPUNCTURE: CPT

## 2020-05-28 PROCEDURE — 00HU33Z INSERTION OF INFUSION DEVICE INTO SPINAL CANAL, PERCUTANEOUS APPROACH: ICD-10-PCS | Performed by: ANESTHESIOLOGY

## 2020-05-28 PROCEDURE — 74011000258 HC RX REV CODE- 258: Performed by: OBSTETRICS & GYNECOLOGY

## 2020-05-28 PROCEDURE — 75410000002 HC LABOR FEE PER 1 HR: Performed by: OBSTETRICS & GYNECOLOGY

## 2020-05-28 PROCEDURE — 65270000029 HC RM PRIVATE

## 2020-05-28 RX ORDER — DIPHENHYDRAMINE HCL 25 MG
25 CAPSULE ORAL
Status: DISCONTINUED | OUTPATIENT
Start: 2020-05-28 | End: 2020-05-30 | Stop reason: HOSPADM

## 2020-05-28 RX ORDER — HYDROCORTISONE ACETATE PRAMOXINE HCL 2.5; 1 G/100G; G/100G
CREAM TOPICAL AS NEEDED
Status: DISCONTINUED | OUTPATIENT
Start: 2020-05-28 | End: 2020-05-30 | Stop reason: HOSPADM

## 2020-05-28 RX ORDER — OXYTOCIN/0.9 % SODIUM CHLORIDE 20/1000 ML
125-500 PLASTIC BAG, INJECTION (ML) INTRAVENOUS ONCE
Status: COMPLETED | OUTPATIENT
Start: 2020-05-28 | End: 2020-05-28

## 2020-05-28 RX ORDER — FENTANYL/BUPIVACAINE/NS/PF 2-1250MCG
12 PREFILLED PUMP RESERVOIR EPIDURAL CONTINUOUS
Status: DISCONTINUED | OUTPATIENT
Start: 2020-05-28 | End: 2020-05-28

## 2020-05-28 RX ORDER — LIDOCAINE HYDROCHLORIDE AND EPINEPHRINE 15; 5 MG/ML; UG/ML
INJECTION, SOLUTION EPIDURAL AS NEEDED
Status: DISCONTINUED | OUTPATIENT
Start: 2020-05-28 | End: 2020-05-28 | Stop reason: HOSPADM

## 2020-05-28 RX ORDER — IBUPROFEN 800 MG/1
800 TABLET ORAL EVERY 8 HOURS
Status: DISCONTINUED | OUTPATIENT
Start: 2020-05-28 | End: 2020-05-30 | Stop reason: HOSPADM

## 2020-05-28 RX ORDER — NALOXONE HYDROCHLORIDE 0.4 MG/ML
0.4 INJECTION, SOLUTION INTRAMUSCULAR; INTRAVENOUS; SUBCUTANEOUS AS NEEDED
Status: DISCONTINUED | OUTPATIENT
Start: 2020-05-28 | End: 2020-05-30 | Stop reason: HOSPADM

## 2020-05-28 RX ORDER — EPHEDRINE SULFATE/0.9% NACL/PF 50 MG/5 ML
10 SYRINGE (ML) INTRAVENOUS
Status: COMPLETED | OUTPATIENT
Start: 2020-05-28 | End: 2020-05-28

## 2020-05-28 RX ORDER — SIMETHICONE 80 MG
80 TABLET,CHEWABLE ORAL
Status: DISCONTINUED | OUTPATIENT
Start: 2020-05-28 | End: 2020-05-30 | Stop reason: HOSPADM

## 2020-05-28 RX ORDER — DOCUSATE SODIUM 100 MG/1
100 CAPSULE, LIQUID FILLED ORAL
Status: DISCONTINUED | OUTPATIENT
Start: 2020-05-28 | End: 2020-05-30 | Stop reason: HOSPADM

## 2020-05-28 RX ORDER — HYDROCODONE BITARTRATE AND ACETAMINOPHEN 5; 325 MG/1; MG/1
1 TABLET ORAL
Status: DISCONTINUED | OUTPATIENT
Start: 2020-05-28 | End: 2020-05-30 | Stop reason: HOSPADM

## 2020-05-28 RX ORDER — BUPIVACAINE HYDROCHLORIDE 2.5 MG/ML
INJECTION, SOLUTION EPIDURAL; INFILTRATION; INTRACAUDAL AS NEEDED
Status: DISCONTINUED | OUTPATIENT
Start: 2020-05-28 | End: 2020-05-28 | Stop reason: HOSPADM

## 2020-05-28 RX ORDER — ACETAMINOPHEN 325 MG/1
650 TABLET ORAL
Status: DISCONTINUED | OUTPATIENT
Start: 2020-05-28 | End: 2020-05-30 | Stop reason: HOSPADM

## 2020-05-28 RX ORDER — ONDANSETRON 4 MG/1
4 TABLET, ORALLY DISINTEGRATING ORAL
Status: ACTIVE | OUTPATIENT
Start: 2020-05-28 | End: 2020-05-29

## 2020-05-28 RX ADMIN — OXYTOCIN 4 MILLI-UNITS/MIN: 10 INJECTION, SOLUTION INTRAMUSCULAR; INTRAVENOUS at 09:32

## 2020-05-28 RX ADMIN — SODIUM CHLORIDE 2.5 MILLION UNITS: 900 INJECTION, SOLUTION INTRAVENOUS at 11:30

## 2020-05-28 RX ADMIN — Medication 20 MG: at 10:42

## 2020-05-28 RX ADMIN — OXYTOCIN 2 MILLI-UNITS/MIN: 10 INJECTION, SOLUTION INTRAMUSCULAR; INTRAVENOUS at 08:02

## 2020-05-28 RX ADMIN — SODIUM CHLORIDE 2.5 MILLION UNITS: 900 INJECTION, SOLUTION INTRAVENOUS at 15:23

## 2020-05-28 RX ADMIN — FAMOTIDINE 20 MG: 20 TABLET, FILM COATED ORAL at 08:34

## 2020-05-28 RX ADMIN — MAGNESIUM SULFATE HEPTAHYDRATE 2 G/HR: 40 INJECTION, SOLUTION INTRAVENOUS at 11:11

## 2020-05-28 RX ADMIN — SODIUM CHLORIDE, SODIUM LACTATE, POTASSIUM CHLORIDE, AND CALCIUM CHLORIDE 999 ML/HR: 600; 310; 30; 20 INJECTION, SOLUTION INTRAVENOUS at 09:35

## 2020-05-28 RX ADMIN — Medication 12 ML/HR: at 11:35

## 2020-05-28 RX ADMIN — Medication 10 ML/HR: at 15:23

## 2020-05-28 RX ADMIN — FAMOTIDINE 20 MG: 20 TABLET, FILM COATED ORAL at 18:44

## 2020-05-28 RX ADMIN — LIDOCAINE HYDROCHLORIDE AND EPINEPHRINE 4 ML: 15; 5 INJECTION, SOLUTION EPIDURAL at 10:35

## 2020-05-28 RX ADMIN — SODIUM CHLORIDE 2.5 MILLION UNITS: 900 INJECTION, SOLUTION INTRAVENOUS at 07:34

## 2020-05-28 RX ADMIN — BUPIVACAINE HYDROCHLORIDE 10 ML: 2.5 INJECTION, SOLUTION EPIDURAL; INFILTRATION; INTRACAUDAL; PERINEURAL at 10:33

## 2020-05-28 RX ADMIN — IBUPROFEN 800 MG: 800 TABLET ORAL at 23:13

## 2020-05-28 RX ADMIN — Medication 5000 MILLI-UNITS/HR: at 18:42

## 2020-05-28 RX ADMIN — OXYTOCIN 8 MILLI-UNITS/MIN: 10 INJECTION, SOLUTION INTRAMUSCULAR; INTRAVENOUS at 15:40

## 2020-05-28 RX ADMIN — SODIUM CHLORIDE, SODIUM LACTATE, POTASSIUM CHLORIDE, AND CALCIUM CHLORIDE 75 ML/HR: 600; 310; 30; 20 INJECTION, SOLUTION INTRAVENOUS at 11:31

## 2020-05-28 RX ADMIN — MAGNESIUM SULFATE HEPTAHYDRATE 2 G/HR: 40 INJECTION, SOLUTION INTRAVENOUS at 20:32

## 2020-05-28 RX ADMIN — SODIUM CHLORIDE 2.5 MILLION UNITS: 900 INJECTION, SOLUTION INTRAVENOUS at 03:00

## 2020-05-28 RX ADMIN — MAGNESIUM SULFATE HEPTAHYDRATE 2 G/HR: 40 INJECTION, SOLUTION INTRAVENOUS at 01:00

## 2020-05-28 NOTE — PROGRESS NOTES
LATE ENTRY ([facility] down for more than 7 hours -> back up at 0700)    See paper progress note regarding single prolonged fetal decel that returned to baseline with conservative measures and not associated with BP management/hypotension. FHT Cat I since. HA resolved with single dose Fioricet. BP's occasional  non-sustained severe range not requiring treatment overnight. Remainder mild range. Patient remains on Mag Sulfate for sz prophylaxis due to Pre-e w/ SF and PCN for GBS unknown.

## 2020-05-28 NOTE — PROGRESS NOTES
2318 - Bedside and Verbal shift change report given to CHRISSIE Lyon (oncoming nurse) by MALACHI Ariza RN (offgoing nurse). Report included the following information SBAR, Kardex, Intake/Output, MAR, Recent Results and Med Rec Status. Assumed care of pt at this time. 0000 - RN at pt bedside performing shift assessment and mag check. 36 - RN at pt bedside d/t system being down. No ETA on when system will come back up.     0100 - RN at pt bedside hanging new bag of mag. E. Stacey Jaureguie. 12 - While RN at bedside watching FHR tracing, RN notes baby off of EFM. RN readjusting US monitor. Pt turned to lateral left. 0141 - Pt turned to lateral right at this time. FHR still below baseline. 1 - RN pulled call bell out of wall at this time to get more help. Oxygen applied via non-rebreather mask at this time. 5 - Dr. Alegre called to room at this time. 5 - MD arrived at this time. 0200 - RN obtained BP with provider at bedside (160/103). Jones Matos from MD to obtain another BP in 15 minutes. 0 - RN obtained BP (149/93). No further orders. 0300 - RN administering penicillin at this time. 65 - RN drawing labs at this time. CBC and CMP sent to lab.    0500 - RN performing mag check. Pt voided 900 mL urine. 0600 - RN performing mag check. RN has been at pt bedside since 0040. Pt in and out of sleep. 0715 - Bedside and Verbal shift change report given to FELI Savage RN (oncoming nurse) by CHRISSIE Lyon (offgoing nurse). Report included the following information SBAR, Kardex, Procedure Summary, Intake/Output, MAR and Recent Results.

## 2020-05-28 NOTE — PROGRESS NOTES
Patient pushing well good progress. Desired continuing with JORJE    Fse: 120s mild mely   +mely decel 60s < 1min with spont recovery to baseline. Will continue while making good progress.   Pt desires continue JORJE vs instrument delivery vs CS if continue to progress    Olena Klein MD

## 2020-05-28 NOTE — PROGRESS NOTES
Labor Progress Note    Patient seen, fetal heart rate and contraction pattern evaluated. Pitocin for IOL delayed 3 hours because of computer down time. Physical Exam:  Visit Vitals  BP (!) 153/102   Pulse 78   Temp 98 °F (36.7 °C)   Resp 15   SpO2 99%   Breastfeeding Yes       Cervical Exam:      60/4/-3/vtx    Membranes:  Artificial Rupture of Membranes; Amniotic Fluid: medium amount of clear fluid      Assessment/Plan:  31 y.o.  36w1d     Reassuring fetal status. Anticipate   CEFM/TOCO    Annabella Lopes MD      NST Inpatient Procedure Note    Ayesha Ibrahim presents for fetal non-stress test.    Indication is labor. She is 36w1d. She has been monitored for more than 60 minutes. NST Interpretation:   FHR baseline 130 bpm,   Variability mild  Accelerations present. Decelerations Absent. Uterine contractions were irregular     Assessment    NST is reassuring. Patient does need admission/observation for further monitoring.       Plan:   Pitocin per protocol while RFS  Epidural prn   [x] Continue admission to labor and delivery    [] Continue observation   [] Keep routine OB follow up upon discharge   [] Reviewed fetal movement kick counts, notify MD if decreased   []    []

## 2020-05-28 NOTE — L&D DELIVERY NOTE
Delivery Summary    Patient: Pedro Davis MRN: [id number]  SSN: [id number]    YOB: 1990  Age: 31 y.o. Sex: female        Information for the patient's :  Ulises Lindsey [[id number]]       Labor Events:    Labor: No    Steroids: None   Cervical Ripening Date/Time: 2020 4:45 PM   Cervical Ripening Type: Chavez/EASI   Antibiotics During Labor: Yes   Rupture Identifier: Sac 1    Rupture Date/Time: 2020 8:17 AM   Rupture Type: AROM   Amniotic Fluid Volume: Moderate    Amniotic Fluid Description: Clear    Amniotic Fluid Odor: None    Induction: Chavez Bulb (balloon); Oxytocin       Induction Date/Time: 2020 4:45 PM    Indications for Induction: Severe Preeclampsia    Augmentation:     Augmentation Date/Time:      Indications for Augmentation:     Labor complications: Dysfunctional Labor       Additional complications:        Delivery Events:  Indications For Episiotomy:     Episiotomy: None   Perineal Laceration(s): 3rd   Repaired: Yes   Periurethral Laceration Location:      Repaired:     Labial Laceration Location: left   Repaired: Yes   Sulcal Laceration Location:     Repaired:     Vaginal Laceration Location:     Repaired:     Cervical Laceration Location:     Repaired:     Repair Suture: Vicryl 3-0; Other (See Note); Monocryl 2-0   Number of Repair Packets: 6   Estimated Blood Loss (ml):  ml   Quantitative Blood Loss (ml):                Delivery Date: 2020    Delivery Time: 5:36 PM    Delivery Type: Vaginal, Vacuum (Extractor)  Vacuum attempted? No    Vacuum indication: Fetal Heart Rate or Rhythm Abnormality   Vacuum type: Kiwi Pro (bell)   Application location:     First Attempt     Time applied: 2020  5:36 PM   Time removed: 2020  5:36 PM   Second Attempt    Time applied:     Time removed:      Third Attempt    Time applied:     Time removed:     Number of pulls: 1   Number of pop-offs: 0   Low-end pressure range:     High-end pressure range: Total application time:  seconds   Applied by: DR. HAMMER   Failed? No     Sex:  Female     Gestational Age: 43w3d  Delivery Clinician:  Rosalie Freitas  Living Status: Living   Delivery Location: L&D 210          APGARS  One minute Five minutes Ten minutes   Skin color: 0   1        Heart rate: 2   2        Grimace: 2   2        Muscle tone: 2   2        Breathin   2        Totals: 8   9          Presentation: Vertex    Position:   Occiput    Resuscitation Method:  Suctioning-bulb; Tactile Stimulation     Meconium Stained: None      Cord Information: 3 Vessels  Complications: Nuchal Cord With Compressions  Cord around: head  Delayed cord clamping? No  Cord clamped date/time:2020  5:36 PM  Disposition of Cord Blood: Discard    Blood Gases Sent?: Yes    Placenta:  Date/Time: 2020  5:46 PM  Removal: Expressed      Appearance: Normal;Intact      Measurements:  Birth Weight: 4 lb 15.2 oz (2.245 kg)      Birth Length: 1' 6\" (0.457 m)      Head Circumference: 11.81\" (0.3 m)      Chest Circumference: 11.02\" (0.28 m)     Abdominal Girth: 11.02\" (0.28 m)    Other Providers:   SAIDA HAMMER;KEITH FUENTES;SANTHOSH WOOTEN;SHANICE GREENE;EMMA VALENCIA;WING BROWNING;CORDELL LAY;CATRACHITO HUTTON;ZABRINA TANG;HAY YOUNG, Obstetrician;Primary Nurse;Primary  Nurse;Nicu Nurse;Neonatologist;Tech;Nurse Practitioner;Tech;Charge Nurse;Respiratory Therapist     The indication, risks, and benefits of vacuum delivery compared to  delivery were discussed with the patient and attendant family member. All questions were answered. Chavez catheter had been recently removed. Kiwi placement was  easily achieved and positioning was checked and verified prior to attempt at deliver. Pressure was placed into the green range and with UC and maternal effort the baby was delivered with one pull and then pressure released  Body delivered quickly after delivery of head.   NC x1 reduced. NICU present for delivery. Cord clamped x2 and cut. Cord pH obtained by Mine Sue MD .  Placenta noted to have lateral cord insertion and was sent to pathology. Third degree repaired with 0 vicryl on rectus capsule, vaginal muscle re approximated with 3-0 and then 2-0 vicryl in layers. The left labial repair was done with 3-0 vicryl. O70.21 IIIa - Third degree perineal laceration during delivery with less than 50% of external and sphincter (EAS) thickness torn  Repair was done end to end with no debridement and the length of the partial third degree laceration was 2cm. The cumulative length of the repair was ~12 cm. Counts were correct. One vaginal packing was placed in vagina with about 1/2 outside the vagina to apply pressure on repair for hemostasis at raw edges of vaginal tissue. Repair noted to be intact and primarily hemostatic  Fundus firm. EBL 500cc. Group B Strep: No results found for: Emily Huffman  Information for the patient's :  Alvaro Aguirre [[id number]]   No results found for: ABORH, PCTABR, PCTDIG, BILI, ABORHEXT, ABORH    No results for input(s): PCO2CB, PO2CB, HCO3I, SO2I, IBD, PTEMPI, SPECTI, PHICB, ISITE, IDEV, IALLEN in the last 72 hours.

## 2020-05-28 NOTE — PROGRESS NOTES
0715 Bedside and Verbal shift change report given to this RN (oncoming nurse) by CHRISSIE Lovett (offgoing nurse). Report included the following information SBAR, Kardex, Intake/Output, MAR and Recent Results. 5156 Patient assessed. Reports blurry vision, no floaters. Denies h/a, RUQ pain, difficult breathing. Patient and SO oriented to room and unit including call bell and emergency cords. Provided with fresh ice water and requesting morning pepcid. No other needs. 1609 Dr. Mery Perez at bedside. Josph Presto catheter removed per MD. SVE 4/60. AROM by Dr. Mery Perez. Moderate amount of clear, odorless amniotic fluid noted on chucks. Pads changed. MD made aware of deceleration overnight and recent blood pressure/ No new orders at this time. 9196 Patient assisted to [facility] commode. 3735 Blood work sent to Mikaela Almonte called in regards to amount of pre-load fluid bolus for epidural. TOR for 500cc. IV fluid bolus started per patient request.     4905 NICU made aware that Dr. Mery Perez is requesting them at delivery. 1012 Patient assisted to [facility]. 36 Dr. Luster Jacobson called for epidural placement. Per MD, \"will be there in a little while. \"    [phone] [id number] for epidural placement. 1029 Patient reports ringing in ears and feeling dizzy. Dr. Sarah Bueno finishing up epidural.    7241 Dr. Sarah Bueno leaving bedside after epidural placement. 1039 Patient feeling nauseated. Given ice pack and green bag. IV fluid bolus started. 1042  made aware of patient's symptoms and blood pressure. TORB from to give 20mg ephedrine now. 1045 Patient turned to R lateral.     1050 Dr. Sarah Bueno at bedside. Patient reports feeling better. 1120 Patient continuing to rest on R lateral. SO at bedside. Call bell within reach. 1135 Patient reports feeling comfortable but able to wiggle toes.  Patient hooked up to epidural pump and instructed to notify this RN if she feels nauseated, dizzy, or ringing in her ears. Patient verbalizing understanding. No needs expressed at this time. Megan D 25 changed. Patient turned to L lateral with peanut ball. No needs at this time. \    21  Patient reporting constant vaginal pressure. Comfortable from epidural. No urge to have BM or urge to push. Perfectserve message for Dr. Noemy Capellan. [address] from Dr. Noemy Capellan to repeat SVE now. 1311 Patient turned to R lateral with peanut ball. No need expressed. 1324 Patient reports feeling rectal pressure with contractions. Patient repositioned to high fowlers. 46 Dr. Noemy Capellan notified of SVE of 10cm. 1448 Per MD, start pushing . 1455 Patient's legs placed in stirrups. Patient educated on how to push effectively. Patient bearing down with contraction. Margot Sullivan 55 from Dr. Noemy Capellan to turn down epidural.     1529 Dr. Noemy Capellan at bedside to assess pushing progress. 1540 Pitocin increased per MD order. 1545 Patient trying closed-knee pushing. Dr. Noemy Capellan remaining at bedside. [address] Dr. Noemy Capellan leaving bedside at this time. Patient continuing to with contractions. 1626 Epidural turned down to 8 per MD order. Patient continuing to bear down with contractions. 6[address] B [facility] Dr. Noemy Capellan returning to bedside to assess pushing progress. MD reviewing EFM tracing. VORB to increase pitocin. 1656 BP cuff readjusted. Dr. Noemy Capellan remaining at bedside. 9917 Per Dr. Noemy Capellan remove grayson. MD leaving bedside at this time. 18 Dr. Noemy Capellan returning to bedside. [address] and NICU called. 1734 NICU at delivery    1736 Kiwi vacuum applied by Dr. Noemy Capellan. One pull.  head out. One nuchal cord noted around  neck. MD able to deliver through. Successful vacuum assisted vaginal delivery with no popoffs. Jack Godoy, NICU RN drying and stimulating baby on maternal abdomen. Cord clamped by MD. [place] brought to warmer for NICU evaluation. 1 Dr. Noemy Capellan drawing arterial cord gas.  VORB fro pitocin bolus. [address] [facility] team leaving bedside after assessing . Intact placenta expressed by Dr. Branden Kaba. MD remaining at bedside for partial third degree repair with assistance from FELICIA Krueger, OBT. 175 Vaginal pack in. Dr. Branden Kaba continuing with repair. [place] skin to skin with patient.  Vaginal packing removed by Dr. Branden Kaba.  New vaginal packing inserted by Dr. Branden Kaba. Per MD, plan to remove vaginal packing in about 1 hour. *Magnesium check not fully completed (reflex, DTRs, respirations breath souds, MD and RN at bedside. 182 Vaginal repair complete, bleeding stable. All sharps and instruments accounted for except 1 vaginal packing.  mL, Dr. Branden Kaba aware. Per MD, plan to continue magnesium for 12 hours, unless BP unstable. VORB to straight cath patient as needed, no grayson catheter at this time. Dr. Branden Kaba leaving bedside. 601 [facility] provided by BRITTANIE Amaro, OBT and FELICIA Chino, OBT.     1900 Bedside and Verbal shift change report given to BRITTANIE Marroquin (oncoming nurse) by this RN (offgoing nurse). Report included the following information SBAR, Kardex, Intake/Output, MAR and Recent Results.

## 2020-05-28 NOTE — PROGRESS NOTES
Pt c/c +2 pushing well    Visit Vitals  BP (!) 142/93   Pulse 85   Temp 97.6 °F (36.4 °C)   Resp 18   SpO2 98%   Breastfeeding Yes       Fse: 120s mod mely mely decel while pushing, resolved with maternal position changes    IUPC q 3-5 min    30 y.o.  36w1d    Severe preeclampsia    Anticipate   NICU for delivery    Severiano Davis MD

## 2020-05-28 NOTE — PROGRESS NOTES
VAVD Delivery Note    Patient C/C/+2, pushed over intact perineum. LBFI  NC x1 reduced after delivery  Spontaneous placenta delivery. Laceration repaired partial 3rd degree laceration with bilateral lateral vaginal extension and left labial laceration. Repair assisted by surgical tech for visualization.    Counts correct

## 2020-05-28 NOTE — PROGRESS NOTES
Pt with some dizziness after epidural.  No ha/cp/sob vision change RUQ pain. Visit Vitals  /71   Pulse 78   Temp 97.7 °F (36.5 °C)   Resp 16   SpO2 100%   Breastfeeding Yes       Cervical Exam  Dilation (cm): 4  Eff: 60 %  Vaginal exam done by? : Dr. Akosua Valle Status: AROM    FSE and IUPC placed without difficulty for improved fetal monitoring and titration of pitocon.     31 y.o.  36w1d   Severe Preeclampsia  No s/sx mag toxicity    Anticipate

## 2020-05-28 NOTE — ANESTHESIA PREPROCEDURE EVALUATION
Relevant Problems   No relevant active problems       Anesthetic History   No history of anesthetic complications            Review of Systems / Medical History  Patient summary reviewed and pertinent labs reviewed    Pulmonary  Within defined limits                 Neuro/Psych   Within defined limits           Cardiovascular    Hypertension              Exercise tolerance: >4 METS  Comments: PIH   GI/Hepatic/Renal  Within defined limits              Endo/Other  Within defined limits           Other Findings              Physical Exam    Airway  Mallampati: II  TM Distance: 4 - 6 cm  Neck ROM: normal range of motion   Mouth opening: Normal     Cardiovascular  Regular rate and rhythm,  S1 and S2 normal,  no murmur, click, rub, or gallop  Rhythm: regular  Rate: normal         Dental  No notable dental hx       Pulmonary  Breath sounds clear to auscultation               Abdominal  GI exam deferred       Other Findings            Anesthetic Plan    ASA: 2  Anesthesia type: epidural            Anesthetic plan and risks discussed with: Patient      Charting completed after epidural placement.

## 2020-05-28 NOTE — ANESTHESIA PROCEDURE NOTES
Epidural Block    Start time: 5/28/2020 10:25 AM  End time: 5/28/2020 10:37 AM  Performed by: Tanmay Betancourt MD  Authorized by: Tanmay Betancourt MD     Pre-Procedure  Indication: labor epidural    Preanesthetic Checklist: patient identified, risks and benefits discussed, anesthesia consent, timeout performed and anesthesia consent    Timeout Time: 10:25        Epidural:   Patient position:  Seated  Prep region:  Lumbar  Prep: Betadine    Location:  L3-4    Needle and Epidural Catheter:   Needle Type:  Tuohy  Needle Gauge:  17 G  Injection Technique:  Loss of resistance using air  Attempts:  1  Catheter Size:  18 G  Events: no paresthesia and negative aspiration test    Test Dose:  Lidocaine 1.5% w/ epi and negative    Assessment:   Catheter Secured:  Tegaderm and tape  Insertion:  Uncomplicated  Patient tolerance:  Patient tolerated the procedure well with no immediate complications

## 2020-05-28 NOTE — PROGRESS NOTES
1900:Bedside and Verbal shift change report given to BRITTANIE Morel RN (oncoming nurse) by Alisia Moore RN (offgoing nurse). Report included the following information SBAR, Kardex, Intake/Output, MAR, Recent Results and Med Rec Status. 1934: Dr. Maricel Wadsworth called about removing pt vaginal packing. MD stated she will come to the bedside at this time. 1939: Dr. Maricel Wadsworth at the bedside at this time removing pt vaginal packing. Pt pads changed by this RN at this time. 9808: Bedside and Verbal shift change report given to FELI Ferro RN (oncoming nurse) by Ubaldo Morel RN (offgoing nurse). Report included the following information SBAR, Kardex, Intake/Output, MAR, Recent Results and Med Rec Status.

## 2020-05-29 LAB — GP B STREP DNA SPEC QL NAA+PROBE: NEGATIVE

## 2020-05-29 PROCEDURE — 65270000029 HC RM PRIVATE

## 2020-05-29 PROCEDURE — 74011250636 HC RX REV CODE- 250/636: Performed by: OBSTETRICS & GYNECOLOGY

## 2020-05-29 PROCEDURE — 77030021125

## 2020-05-29 PROCEDURE — 74011250637 HC RX REV CODE- 250/637: Performed by: OBSTETRICS & GYNECOLOGY

## 2020-05-29 RX ADMIN — SODIUM CHLORIDE, SODIUM LACTATE, POTASSIUM CHLORIDE, AND CALCIUM CHLORIDE 75 ML/HR: 600; 310; 30; 20 INJECTION, SOLUTION INTRAVENOUS at 01:24

## 2020-05-29 RX ADMIN — MAGNESIUM SULFATE HEPTAHYDRATE 2 G/HR: 40 INJECTION, SOLUTION INTRAVENOUS at 07:13

## 2020-05-29 RX ADMIN — IBUPROFEN 800 MG: 800 TABLET ORAL at 14:59

## 2020-05-29 RX ADMIN — IBUPROFEN 800 MG: 800 TABLET ORAL at 07:27

## 2020-05-29 RX ADMIN — IBUPROFEN 800 MG: 800 TABLET ORAL at 22:16

## 2020-05-29 RX ADMIN — DOCUSATE SODIUM 100 MG: 100 CAPSULE, LIQUID FILLED ORAL at 07:27

## 2020-05-29 NOTE — PROGRESS NOTES
Post-Partum Day Number 1 Progress Note    Laree Fothergill       Information for the patient's :  Penny Treviño [[id number]]   Vaginal, Vacuum (Extractor)   Patient doing well without significant complaint. Voiding without difficulty, normal lochia. Vitals:  Visit Vitals  /81   Pulse 88   Temp 98 °F (36.7 °C)   Resp 16   LMP 2019 (Approximate) Comment: \"shorter cycle than usual\"   SpO2 98%   Breastfeeding Yes     Temp (24hrs), Av.9 °F (36.6 °C), Min:97.6 °F (36.4 °C), Max:98.3 °F (36.8 °C)        Exam:   Patient without distress. Abdomen soft, fundus firm, nontender                Perineum with normal lochia noted. Lower extremities are negative for swelling, cords or tenderness. Labs:     Lab Results   Component Value Date/Time    WBC 14.9 (H) 2020 09:26 AM    WBC 10.0 2020 11:18 AM    WBC 10.7 2020 03:36 PM    WBC 9.2 10/31/2019 03:22 PM    HGB 14.7 2020 09:26 AM    HGB 14.7 2020 11:18 AM    HGB 13.1 2020 03:36 PM    HGB 13.5 10/31/2019 03:22 PM    HCT 43.4 2020 09:26 AM    HCT 43.5 2020 11:18 AM    HCT 41.3 2020 03:36 PM    HCT 40.3 10/31/2019 03:22 PM    PLATELET 621  09:26 AM    PLATELET 958  11:18 AM    PLATELET 317  03:36 PM    PLATELET 403  03:22 PM    Hgb, External 13.1 2020    Hgb, External 13.5 10/31/2019    Hct, External 41.3 2020    Hct, External 40.3 10/31/2019    Platelet cnt., External 168 2020    Platelet cnt., External 218 10/31/2019       No results found for this or any previous visit (from the past 24 hour(s)). Assessment: Doing well, post partum day 1. Pre-elcmapsia with mild range BP's. Will continue Mg for 24 hours after delivery. Plan:  1. Continue routine postpartum and perineal care as well as maternal education.

## 2020-05-29 NOTE — ROUTINE PROCESS
Bedside and Verbal shift change report given to sarah garcia rn (oncoming nurse) by  Neo Amin (offgoing nurse). Report included the following information SBAR, Kardex, Procedure Summary, Intake/Output, MAR, Accordion and Recent Results.

## 2020-05-29 NOTE — LACTATION NOTE
This note was copied from a baby's chart. 1030 - Rounding to [facility] consult. Mom on magnesium and feeling very drowsy. Baby due to feed at 6. Mom will call for assistance with that feed. 18 - Mom's initial plan was exclusive breastfeeding, but as mom was on Mg therapy for preeclampsia, mom has been formula feeding infant until feeling better. Now off magnesium, fed at the breast with nursery nurse at noon. RN says baby had some good burst of sucking. Normal late  behaviors discussed. Importance and benefit of kangaroo care highlighted. Gentle ways to wake discussed and mom taught hand expression and to feed drops for any non feeds. Mom encouraged to call for assistance with next feed or as problems arise. Discussed with mother her plan for feeding. Reviewed the benefits of exclusive breast milk feeding during the hospital stay. Informed her of the risks of using formula to supplement in the first few days of life as well as the benefits of successful breast milk feeding; referred her to the Breastfeeding booklet about this information. She acknowledges understanding of information reviewed and states that it is her plan to breastfeed her infant. Will support her choice and offer additional information as needed. Reviewed breastfeeding basics:  How milk is made and normal  breastfeeding behaviors discussed. Supply and demand,  stomach size, early feeding cues, skin to skin bonding with comfortable positioning and baby led latch-on reviewed. How to identify signs of successful breastfeeding sessions reviewed; education on assymetrical latch, signs of effective latching vs shallow, in-effective latching, normal  feeding frequency and duration and expected infant output discussed.   Normal course of breastfeeding discussed including the AAP's recommendation that children receive exclusive breast milk feedings for the first six months of life with breast milk feedings to continue through the first year of life and/or beyond as complimentary table foods are added. Breastfeeding Booklet and Warm line information provided with discussion. Discussed typical  weight loss and the importance of pediatrician appointment within 24-48 hours of discharge, at 2 weeks of life and normalcy of requesting pediatric weight checks as needed in between visits.

## 2020-05-29 NOTE — PROGRESS NOTES
GBS negative  Update PNL/Chart  Confirm result if FINAL  Update chart, pt delivered, cancel future OB appts pls

## 2020-05-29 NOTE — PROGRESS NOTES
0710 Bedside and Verbal shift change report given to this RN (oncoming nurse) by BRITTANIE Smith (offgoing nurse). Report included the following information SBAR, Kardex, Intake/Output, MAR and Recent Results. [id number] Patient assessed. Reports feeling \"heavy\" from medicine. Mag check WNL. Reports 3/10 perineal pain, RN will administer Motrin. Bleeding stable. Grayson catheter in place. Denies h/a, blurry vision, RUQ pain. Patient and SO re-oriented to room and unit. Patient provided with fresh ice water and coffee. No other needs. 3412 Patient resting in bed, watching a movie. Reports that she has been waiting for a long time for her food, will call dietary. No other needs at this time. 0845 Per dietary, trays are being delivered now. 7856 Patient eating breakfast.     0915 Dr. Antonio Munoz made aware of patient's morning blood pressures. VORB  to continue magnesium PP for 24 hours. Plan to discontinue grayson cathter and magnesium at that time if patient stable. 4307 Patient resting in bed. Verbalizing understanding and agrees to POC. SO at beside. No needs expressed at this time. 1035 Patient resting in bed trying to sleep. SO at bedside holding baby. 1148 Patient's reflexes decreased. Lungs clear, normal UO, O2 sats stable. Mag paused. Will contact Dr. Antonio Munoz. 1203 [employer] message sen to Dr. Antonio Munoz. [facility] Dr. Antonio Munoz called and updated on patient's last mag check. TORB to keep magnesium off. If patient stable, patient may be transferred to MIU.     1235 Patient sitting up in bed. Reflexes normal. No needs expressed at this time. SO remaining at bedside. [phone] Patient breastfeeding. Arm bent. Cuff readjusted. 1400 Patient assisted to bedside commode and assisted with rosemarie-care. Gown and bedpad changed. Grayson cathter care completed. Patient tolerated well. No other needs at this time. 1450 Patient resting in bed. No needs expressed at this time.      1546 Patient was feeding. Reports arm was bent. Cuff repositioned. 1600 Patient awake, holding baby. [place] Report: Mother    Bedside and Verbal report given to Sudhir Mcgee RN (full name & credentials) on this patient, who is now being transferred to MIU (unit) for routine progression of care. Report consisted of patient's Situation, Background, Assessment and Recommendations (SBAR).  ID bands were compared with the identification form, and verified with the patient and receiving nurse. Information from the SBAR, Kardex, Intake/Output, MAR and Recent Results and the Leeann Report was reviewed with the receiving nurse; opportunity for questions and clarification provided.

## 2020-05-30 VITALS
OXYGEN SATURATION: 98 % | RESPIRATION RATE: 16 BRPM | HEART RATE: 74 BPM | DIASTOLIC BLOOD PRESSURE: 74 MMHG | SYSTOLIC BLOOD PRESSURE: 126 MMHG | TEMPERATURE: 97.7 F

## 2020-05-30 PROCEDURE — 74011250637 HC RX REV CODE- 250/637: Performed by: OBSTETRICS & GYNECOLOGY

## 2020-05-30 PROCEDURE — 74011250637 HC RX REV CODE- 250/637: Performed by: FAMILY MEDICINE

## 2020-05-30 RX ORDER — IBUPROFEN 800 MG/1
800 TABLET ORAL EVERY 8 HOURS
Qty: 60 TAB | Refills: 0 | Status: SHIPPED | OUTPATIENT
Start: 2020-05-30 | End: 2020-10-08

## 2020-05-30 RX ORDER — LABETALOL 200 MG/1
200 TABLET, FILM COATED ORAL 2 TIMES DAILY
Qty: 60 TAB | Refills: 0 | Status: SHIPPED | OUTPATIENT
Start: 2020-05-30 | End: 2020-10-08

## 2020-05-30 RX ORDER — LABETALOL 200 MG/1
200 TABLET, FILM COATED ORAL 2 TIMES DAILY
Status: DISCONTINUED | OUTPATIENT
Start: 2020-05-30 | End: 2020-05-30 | Stop reason: HOSPADM

## 2020-05-30 RX ADMIN — LABETALOL HYDROCHLORIDE 200 MG: 200 TABLET, FILM COATED ORAL at 12:15

## 2020-05-30 RX ADMIN — IBUPROFEN 800 MG: 800 TABLET ORAL at 06:21

## 2020-05-30 NOTE — DISCHARGE INSTRUCTIONS
Patient Discharge Instructions    Clarisse Howard / [id number] : 1990    Admitted 2020 Discharged: 2020       Please bring this form with you to show your care provider at your follow-up appointment. Primary care provider:  None    Discharging provider:  Ajay Aldana DO              ACUTE DIAGNOSES:  · Pregnancy [Z34.90]  · Preeclampsia [O14.90]        FOLLOW-UP CARE RECOMMENDATIONS:    Follow-up Information     Follow up With Specialties Details Why Oliver Driver MD Obstetrics & Gynecology, Gynecology, Obstetrics Schedule an appointment as soon as possible for a visit 1-2 weeks for blood pressure check  [address]  [phone]            Continuing Therapy:  - Please continue Motrin 800 mg, 1 tablet every 8 hours for the next 2 days, then 1 tablet every 8 hours as needed for pain  -Labetalol 200mg twice a day   - Please continue Colace 100 mg for constipation, take one tablet BID until having regular bowel movements  - Please continue your prenatal vitamins     Follow-up tests needed: blood pressure check in office in 1 week    Pending test results:   Please make sure you review these results with your outpatient follow-up provider(s). Specific symptoms to watch for: chest pain, shortness of breath, fever, chills, nausea, vomiting, diarrhea, change in mentation, falling, weakness, bleeding. DIET/what to eat:  Regular Diet    ACTIVITY:   Activity Instructions    Do not lift anything heavier than your baby for 6 weeks. Nothing in the vagina for 6 weeks. After having a /vaginal delivery you will still need to wait about six weeks before having sex. You will have your six-week postpartum check-up at this time. You are ok to drive as long as you are not taking Percocet or other narcotic pain medication (Motrin is ok). I understand that if any problems occur once I am at home I am to contact my physician.     These instructions were explained to me and I had the opportunity to ask questions. I understand and acknowledge receipt of the instructions indicated above.                                                                                                                                                Physician's or R.N.'s Signature                                                                  Date/Time                                                                                                                                              Patient or Representative Signature                                                          Date/Time

## 2020-05-30 NOTE — ROUTINE PROCESS
Bedside and Verbal shift change report given to REBEKAH Harris RN (oncoming nurse) by Pebbles Llamas RN (offgoing nurse). Report included the following information SBAR, Kardex, Intake/Output, MAR and Accordion.

## 2020-05-30 NOTE — ROUTINE PROCESS
Patient off unit in stable condition via wheelchair with volunteers for discharge home per  MD.  Patient is to follow up in 1 week for blood pressure and medication check and again in  6 weeks and is aware. Prescriptions called to patients pharmacy. Patient denies H/A, dizziness, nausea and or vomiting or pain at this time. Infant in car seat with mom.

## 2020-05-30 NOTE — LACTATION NOTE
This note was copied from a baby's chart. 12 -   Mom states baby has been feeding frequently and she is keeping baby skin to skin. Baby has had occasional formula supplementation overnight. Chart shows adequate output, weight loss WNL at 7. 8%. Continue to encourage unrestricted access to breasts and frequent feeds. Chart shows numerous feedings, void, stool WNL. Discussed importance of monitoring outputs and feedings on first week of life. Discussed ways to tell if baby is  getting enough breast milk, ie  voids and stools, change in color of stool, and return to birth wt within 2 weeks. Follow up with pediatrician visit for weight check in 1-2 days (per AAP guidelines.)  Encouraged to call Warm Line  [phone]  for any questions/problems that arise. Mother also given breastfeeding support group dates and times for any future needs. Pt will successfully establish breastfeeding by feeding in response to early feeding cues   or wake every 3h, will obtain deep latch, and will keep log of feedings/output. Taught to BF at hunger cues and or q 2-3 hrs and to offer 10-20 drops of hand expressed colostrum at any non-feeds.       Breast Assessment  Left Breast: Medium  Left Nipple: Everted, Intact  Right Breast: Medium  Right Nipple: Everted, Intact  Breast- Feeding Assessment  Attends Breast-Feeding Classes: Yes  Breast-Feeding Experience: No  Breast Trauma/Surgery: No  Type/Quality: Good(per mother)  Lactation Consultant Visits  Breast-Feedings: Good   Mother/Infant Observation  Mother Observation: Alignment, Breast comfortable, Close hold, Cramps, Lets baby end feeding, Nipple round on release  Infant Observation: Lips flanged, lower, Lips flanged, upper, Opens mouth  LATCH Documentation  Latch: Repeated attempts, hold nipple in mouth, stimulate to suck  Audible Swallowing: A few with stimulation  Type of Nipple: Everted (after stimulation)  Comfort (Breast/Nipple): Soft/non-tender  Hold (Positioning): No assist from staff, mother able to position/hold infant  LATCH Score: 8

## 2020-05-30 NOTE — ROUTINE PROCESS
Notified Dr. Alegre of pt's ongoing BP's of 140's/90's post magnesium with no orders of medications. BP this /95 with a repeat of the same BP 30 minutes later. MD stated Dr. Carol Ward could make a decision on medications when she rounds this morning. Will continue to monitor.

## 2020-05-30 NOTE — DISCHARGE SUMMARY
[address] [facility] [address]   Office [phone], Fax [phone]      Obstetrical Discharge Summary     Name: Clarisse Howard MRN: [id number]  SSN: [id number]    YOB: 1990  Age: 31 y.o. Sex: female      Admit Date: 2020    Discharge Date: 2020     Admitting Physician: Rosalba Murillo MD     Attending Physician:  Nikita Segovia MD     Admission Diagnoses: Pregnancy [Z34.90]; Preeclampsia [O14.90]    Discharge Diagnoses:   Information for the patient's :  Beryle Barrow [[id number]]   Delivery of a 4 lb 15.2 oz (2.245 kg) female infant via Vaginal, Vacuum (Extractor) on 2020 at 5:36 PM  by Chaparrita Harrington. Apgars were 8  and 9 . Additional Diagnoses:   Hospital Problems  Date Reviewed: 2020          Codes Class Noted POA    Pregnancy ICD-10-CM: Z34.90  ICD-9-CM: V22.2  2020 Unknown        Preeclampsia ICD-10-CM: O14.90  ICD-9-CM: 642.40  2020 Unknown             Lab Results   Component Value Date/Time    Rubella, External 1.11 10/31/2019       Immunization(s):   Immunization History   Administered Date(s) Administered    Influenza Vaccine (Quad) PF 10/31/2019    Tdap 2020      Hospital Course:   31 y.o.  s/p VAVD at 36w1d days 2/2 preE. Normal hospital course following the delivery except for mild range BPs for which Labetalol 200 bid was started. On day of discharge, patient is doing well postpartum and has no significant complaints. Pain well-controlled on Motrin 800 mg q8 hrs. Lochia minimal. Tolerating diet, ambulating, passing flatus, and voiding without difficulty. Denies HA, vision changes or RUQ pain.     Please address the following at the postpartum follow up visit:    31 y.o.  PPD2 s/p VAVD c/b preE   Continue routine perineal care   Continue Motrin 800 mg q8 hrs prn for pain   Follow up outpatient in 6 weeks postpartum and 2 weeks postpartum for BP check   D/c home on labetalol 200 bid     Visit Vitals  BP (!) 138/92   Pulse 80   Temp 97.7 °F (36.5 °C)   Resp 18   SpO2 98%   Breastfeeding Yes        Physical Exam: Patient without distress              Abdomen soft, fundus firm below the level of umbilicus, appropriately tender. Lower extremities are negative for swelling, cords, or tenderness. Condition at Discharge:  Stable    Disposition:  Home    Patient Instructions:   Current Discharge Medication List      START taking these medications    Details   labetaloL (NORMODYNE) 200 mg tablet Take 1 Tab by mouth two (2) times a day. Qty: 60 Tab, Refills: 0      ibuprofen (MOTRIN) 800 mg tablet Take 1 Tab by mouth every eight (8) hours. Qty: 60 Tab, Refills: 0         CONTINUE these medications which have NOT CHANGED    Details   famotidine (PEPCID PO) Take  by mouth.      prenatal vit,calc76/iron/folic (PNV 38-2 PO) Take  by mouth.      calcium carbonate (TUMS PO) Take  by mouth. Follow-up Care/Patient Instructions:  Nothing in the vagina for 6 weeks. Activity: activity as tolerated  Diet: regular  Wound Care: as directed       Reference my discharge instructions.     Follow-up Information     Follow up With Specialties Details Why Markus Ford MD Obstetrics & Gynecology, Gynecology, Obstetrics Schedule an appointment as soon as possible for a visit 1-2 weeks for blood pressure check  [address]  [phone]            Signed By:  Libby Garcia DO    Family Medicine Resident

## 2020-05-31 ENCOUNTER — TELEPHONE (OUTPATIENT)
Dept: FAMILY MEDICINE CLINIC | Age: 30
End: 2020-05-31

## 2020-05-31 NOTE — PROGRESS NOTES
Returned after hours page. Patient D/C yesterday on labetalol 200 bid and calling to say she just checked her pressure after taking the med two hours prior and bp 160/110s. No HA, vision changes or RUQ pain. Advised to take 1/2 tab (100mg) more now and if still elevated in 3 hours take 1/2 tab more. Tomorrow continue total dose that she took this evening, twice daily. Call back for any questions.

## 2020-06-01 ENCOUNTER — TELEPHONE (OUTPATIENT)
Dept: OBGYN CLINIC | Age: 30
End: 2020-06-01

## 2020-06-01 NOTE — TELEPHONE ENCOUNTER
Patient said that she turned in [facility] paperwork to [facility] in April and she needs to have it completed and faxed to the attention of:    Lalito Dubon [phone]    Patient said she delivered 5/28/20 and needs to have this updated in the paperwork.

## 2020-06-03 NOTE — TELEPHONE ENCOUNTER
Call received at 115PM    31year old patient delivered on 5/328/2020. Patient calling about her FMLA paper work /short term disability that has not yet been received by her HR. This nurse spoke to [address] who advised that she would fax the paper work today. Patient verbalized understanding.

## 2020-06-04 NOTE — TELEPHONE ENCOUNTER
Call received at 100PM    31year old patient calling back to say that her paper work has not been received by her HR. This nurse transferred the patient to speak to Dr. Yaritza Vega    Patient verbalized understanding.

## 2020-06-10 NOTE — PATIENT INSTRUCTIONS
Stress in Parents of Infants: Care Instructions Your Care Instructions Meeting the increased demands of being a new parent can be a big challenge. It is easy to get overtired and overwhelmed during the first weeks. What used to be a simple chore, such as buying groceries, is not so simple now. Plus, you have new chores, including feeding and changing your new baby. At the end of the day, you may be so tired that you feel like crying. Instead of looking forward to the next day, you may be dreading tomorrow. Like many new parents, you are burned out from the stress of having a new baby. Stress affects each of us differently, and the most effective ways to relieve it are different for each person. You can try different methods to find out which ones work best for you. As the weeks go by, you will begin to develop a rhythm with your baby. Tasks that now seem to take forever will become easier. Many women get the \"baby blues\" during the first few days after childbirth. If you are a new mother and the \"baby blues\" last more than a few days, call your doctor right away. Depression is a medical condition that requires treatment. Follow-up care is a key part of your treatment and safety. Be sure to make and go to all appointments, and call your doctor if you are having problems. It's also a good idea to know your test results and keep a list of the medicines you take. How can you care for yourself at home? · Be kind to yourself. Your new baby takes a lot of work, but he or she can give you a lot of pleasure too. Do not worry about housekeeping for a while. · Allow your friends to bring you meals or do chores. · Limit visitors to as few as you feel you can handle, or ask them not to visit for a while. Before they come, set a limit on how long they will stay. · Sleep when your baby sleeps. Even a short nap helps. · Find what triggers your stress, and avoid those things as much as you can. · If you breastfeed, learn how to collect and store some breast milk so your partner or  can feed the baby while you sleep. Experts usually recommend waiting about a month until breastfeeding is going well before offering a bottle. · Eat a balanced diet so you can keep up your energy. · Drink plenty of fluids throughout the day. · Avoid caffeine and alcohol. Caffeine is found in coffee, tea, cola drinks, chocolate, and other foods. · Limit medicines that can make you more tired, such as tranquilizers and cold and allergy medicines. · Get regular daily exercise, such as walks, to help improve your mood. Rest after you exercise. · Be honest with yourself and those who care about you. Tell them you are stressed and tired. · Talking to other new parents can help. Ask your doctor or child's doctor to suggest support groups for new parents. Hearing that someone else is having the same experiences you are can help a lot. · If you have the baby blues for more than a few days, call your doctor right away. When should you call for help? [id number] anytime you think you may need emergency care. For example, call if: 
· You have thoughts of hurting yourself, your baby, or another person. Call your doctor now or seek immediate medical care if: 
· You are having trouble caring for yourself or your baby. Watch closely for changes in your health, and be sure to contact your doctor if you have any problems. Where can you learn more? Go to http://erica-joseph.info/ Enter H142 in the search box to learn more about \"Stress in Parents of Infants: Care Instructions. \" Current as of: August 22, 2019               Content Version: 12.5 © 1482-4130 [employer]. Care instructions adapted under license by [employer] (which disclaims liability or warranty for this information).  If you have questions about a medical condition or this instruction, always ask your healthcare professional. [address] any warranty or liability for your use of this information.

## 2020-06-11 ENCOUNTER — OFFICE VISIT (OUTPATIENT)
Dept: OBGYN CLINIC | Age: 30
End: 2020-06-11

## 2020-06-11 VITALS
BODY MASS INDEX: 27.51 KG/M2 | DIASTOLIC BLOOD PRESSURE: 72 MMHG | HEIGHT: 66 IN | WEIGHT: 171.2 LBS | SYSTOLIC BLOOD PRESSURE: 108 MMHG

## 2020-06-11 DIAGNOSIS — E83.51 LOW CALCIUM LEVELS: ICD-10-CM

## 2020-06-11 DIAGNOSIS — R20.2 TINGLING: Primary | ICD-10-CM

## 2020-06-11 NOTE — PROGRESS NOTES
[facility]  [email or web address]  [phone]    Neri Glass MD, [facility]       OB/GYN Problem visit    Chief Complaint:   Chief Complaint   Patient presents with    Hypertension       Last or next WWE is: Due in 3 months    History of Present Illness: This is not a new problem being evaluated by this provider. The patient is a 31 y.o.  female who reports having elevated blood pressure  for 2 weeks. She reports the symptoms  has improved. Aggravating factors include none. Alleviating factors include none. Stopped meds    She does not have other concerns. LMP: No LMP recorded. PFSH:  Past Medical History:   Diagnosis Date    Essential hypertension     Gestational hypertension     Pap smear for cervical cancer screening 10/31/2019    Negative    PCOS (polycystic ovarian syndrome) 2018    Polycystic disease, ovaries      Past Surgical History:   Procedure Laterality Date    HX OTHER SURGICAL      wisdom teeth    HX WISDOM TEETH EXTRACTION  2017     Family History   Problem Relation Age of Onset    Endometriosis Mother     Other Mother         Abnormal uterine bleeding    No Known Problems Father     Breast Cancer Maternal Grandmother 52     Social History     Tobacco Use    Smoking status: Never Smoker    Smokeless tobacco: Never Used   Substance Use Topics    Alcohol use: Not Currently    Drug use: Never     No Known Allergies  Current Outpatient Medications   Medication Sig    labetaloL (NORMODYNE) 200 mg tablet Take 1 Tab by mouth two (2) times a day.  prenatal vit,calc76/iron/folic (PNV 51-8 PO) Take  by mouth.  ibuprofen (MOTRIN) 800 mg tablet Take 1 Tab by mouth every eight (8) hours.  famotidine (PEPCID PO) Take  by mouth.  calcium carbonate (TUMS PO) Take  by mouth. No current facility-administered medications for this visit.         Review of Systems:  History obtained from the patient  Constitutional: see hpi  ENT ROS: negative for - hearing change, oral lesions or visual changes  Respiratory: negative for cough, wheezing or dyspnea on exertion  Cardiovascular: negative for chest pain, irregular heart beats, exertional chest pressure/discomfort  Gastrointestinal: negative for dysphagia, nausea and vomiting  Genito-Urinary ROS:  see HPI  Inteument/breast: negative for rash, breast lump and nipple discharge  Musculoskeletal:negative for stiff joints, neck pain and muscle weakness  Endocrine ROS: negative for - breast changes, galactorrhea or temperature intolerance  Hematological and Lymphatic ROS: negative for - blood clots, bruising or swollen lymph nodes    Physical Exam:  Visit Vitals  /72 (BP 1 Location: Right arm, BP Patient Position: Sitting)   Ht 5' 6\" (1.676 m)   Wt 171 lb 3.2 oz (77.7 kg)   BMI 27.63 kg/m²       GENERAL: alert, well appearing, and in no distress  HEAD: normocephalic, atraumatic. PULM: clear to auscultation, no wheezes, rales or rhonchi, symmetric air entry   COR: normal rate and regular rhythm, S1 and S2 normal   ABDOMEN: soft, nontender, nondistended, no masses or organomegaly   NEURO: alert, oriented, normal speech    Assessment:  Encounter Diagnoses   Name Primary?  Tingling Yes    Low calcium levels        Plan:  The patient is advised that she should contact the office if she does not note improvement or if symptoms recur  Recommend follow up with PCP for non-gynecologic complaints and chronic medical problems. She should contact our office with any questions or concerns  She could keep her routine annual exam appointment. CHUCK king  Repeat labs  PP visit six week pp    Orders Placed This Encounter    METABOLIC PANEL, COMPREHENSIVE       No results found for this visit on 06/11/20.

## 2020-06-12 LAB
ALBUMIN SERPL-MCNC: 3.5 G/DL (ref 3.9–5)
ALBUMIN/GLOB SERPL: 1.5 {RATIO} (ref 1.2–2.2)
ALP SERPL-CCNC: 98 IU/L (ref 39–117)
ALT SERPL-CCNC: 18 IU/L (ref 0–32)
AST SERPL-CCNC: 17 IU/L (ref 0–40)
BILIRUB SERPL-MCNC: <0.2 MG/DL (ref 0–1.2)
BUN SERPL-MCNC: 16 MG/DL (ref 6–20)
BUN/CREAT SERPL: 22 (ref 9–23)
CALCIUM SERPL-MCNC: 8.6 MG/DL (ref 8.7–10.2)
CHLORIDE SERPL-SCNC: 105 MMOL/L (ref 96–106)
CO2 SERPL-SCNC: 24 MMOL/L (ref 20–29)
CREAT SERPL-MCNC: 0.74 MG/DL (ref 0.57–1)
GLOBULIN SER CALC-MCNC: 2.3 G/DL (ref 1.5–4.5)
GLUCOSE SERPL-MCNC: 89 MG/DL (ref 65–99)
POTASSIUM SERPL-SCNC: 4.5 MMOL/L (ref 3.5–5.2)
PROT SERPL-MCNC: 5.8 G/DL (ref 6–8.5)
SODIUM SERPL-SCNC: 141 MMOL/L (ref 134–144)
SPECIMEN STATUS REPORT, ROLRST: NORMAL

## 2020-06-12 NOTE — PROGRESS NOTES
The results are normal.   Please notify patient if not on [address]. Recommend f/u if still having symptoms/problems or has additional concerns.

## 2020-07-17 NOTE — PATIENT INSTRUCTIONS
Stress in Parents of Infants: Care Instructions  Your Care Instructions     Meeting the increased demands of being a new parent can be a big challenge. It is easy to get overtired and overwhelmed during the first weeks. What used to be a simple chore, such as buying groceries, is not so simple now. Plus, you have new chores, including feeding and changing your new baby. At the end of the day, you may be so tired that you feel like crying. Instead of looking forward to the next day, you may be dreading tomorrow. Like many new parents, you are burned out from the stress of having a new baby. Stress affects each of us differently, and the most effective ways to relieve it are different for each person. You can try different methods to find out which ones work best for you. As the weeks go by, you will begin to develop a rhythm with your baby. Tasks that now seem to take forever will become easier. Many women get the \"baby blues\" during the first few days after childbirth. If you are a new mother and the \"baby blues\" last more than a few days, call your doctor right away. Depression is a medical condition that requires treatment. Follow-up care is a key part of your treatment and safety. Be sure to make and go to all appointments, and call your doctor if you are having problems. It's also a good idea to know your test results and keep a list of the medicines you take. How can you care for yourself at home? · Be kind to yourself. Your new baby takes a lot of work, but he or she can give you a lot of pleasure too. Do not worry about housekeeping for a while. · Allow your friends to bring you meals or do chores. · Limit visitors to as few as you feel you can handle, or ask them not to visit for a while. Before they come, set a limit on how long they will stay. · Sleep when your baby sleeps. Even a short nap helps. · Find what triggers your stress, and avoid those things as much as you can.   · If you breastfeed, learn how to collect and store some breast milk so your partner or  can feed the baby while you sleep. Experts usually recommend waiting about a month until breastfeeding is going well before offering a bottle. · Eat a balanced diet so you can keep up your energy. · Drink plenty of fluids throughout the day. · Avoid caffeine and alcohol. Caffeine is found in coffee, tea, cola drinks, chocolate, and other foods. · Limit medicines that can make you more tired, such as tranquilizers and cold and allergy medicines. · Get regular daily exercise, such as walks, to help improve your mood. Rest after you exercise. · Be honest with yourself and those who care about you. Tell them you are stressed and tired. · Talking to other new parents can help. Ask your doctor or child's doctor to suggest support groups for new parents. Hearing that someone else is having the same experiences you are can help a lot. · If you have the baby blues for more than a few days, call your doctor right away. When should you call for help? [id number] anytime you think you may need emergency care. For example, call if:  · You have thoughts of hurting yourself, your baby, or another person. Call your doctor now or seek immediate medical care if:  · You are having trouble caring for yourself or your baby. Watch closely for changes in your health, and be sure to contact your doctor if you have any problems. Where can you learn more? Go to http://erica-joseph.info/  Enter H142 in the search box to learn more about \"Stress in Parents of Infants: Care Instructions. \"  Current as of: August 22, 2019               Content Version: 12.5  © 7376-4147 [employer]. Care instructions adapted under license by [employer] (which disclaims liability or warranty for this information).  If you have questions about a medical condition or this instruction, always ask your healthcare professional. [address] any warranty or liability for your use of this information.

## 2020-07-20 ENCOUNTER — OFFICE VISIT (OUTPATIENT)
Dept: OBGYN CLINIC | Age: 30
End: 2020-07-20

## 2020-07-20 VITALS
BODY MASS INDEX: 28.56 KG/M2 | SYSTOLIC BLOOD PRESSURE: 108 MMHG | HEIGHT: 65 IN | WEIGHT: 171.4 LBS | DIASTOLIC BLOOD PRESSURE: 66 MMHG

## 2020-07-20 PROBLEM — O14.90 PREECLAMPSIA: Status: RESOLVED | Noted: 2020-05-27 | Resolved: 2020-07-20

## 2020-07-20 PROBLEM — Z34.00 PRENATAL CARE, FIRST PREGNANCY: Status: RESOLVED | Noted: 2019-10-31 | Resolved: 2020-07-20

## 2020-07-20 PROBLEM — Z34.00 PRENATAL CARE OF PRIMIGRAVIDA, ANTEPARTUM: Status: RESOLVED | Noted: 2020-04-02 | Resolved: 2020-07-20

## 2020-07-20 PROBLEM — Z34.90 PREGNANCY: Status: RESOLVED | Noted: 2020-05-27 | Resolved: 2020-07-20

## 2020-07-20 NOTE — PROGRESS NOTES
Gaye Jarrell MD, [address] [facility]     Postpartum visit    Chief Complaint   Patient presents with   [facility]       Jane Fitzgerald is a 31 y.o. female  who presents for a postpartum exam.     She is now 6 weeks from a vaginal delivery delivery. Patient's last menstrual period was 2020 (approximate). She has had the following significant problems since her delivery: none. She reports her mood as Good. The patient is breastfeeding/pumping breast milk for her baby. The patient would like not like to use birth control. She is due for her next AE in 3 months. Plans to work from home. ? Loss of height      Past Medical History:   Diagnosis Date    Essential hypertension     Gestational hypertension     Pap smear for cervical cancer screening 10/31/2019    Negative    PCOS (polycystic ovarian syndrome) 2018    Polycystic disease, ovaries      Past Surgical History:   Procedure Laterality Date    HX OTHER SURGICAL      wisdom teeth    HX WISDOM TEETH EXTRACTION  2017     Current Outpatient Medications   Medication Sig    prenatal vit,calc76/iron/folic (PNV 44-1 PO) Take  by mouth.  labetaloL (NORMODYNE) 200 mg tablet Take 1 Tab by mouth two (2) times a day.  ibuprofen (MOTRIN) 800 mg tablet Take 1 Tab by mouth every eight (8) hours.  famotidine (PEPCID PO) Take  by mouth.  calcium carbonate (TUMS PO) Take  by mouth. No current facility-administered medications for this visit.       No Known Allergies  Family History   Problem Relation Age of Onset    Endometriosis Mother     Other Mother         Abnormal uterine bleeding    No Known Problems Father     Breast Cancer Maternal Grandmother 52     Social History     Socioeconomic History    Marital status:      Spouse name: Not on file    Number of children: Not on file    Years of education: Not on file    Highest education level: Not on file   Occupational History    Not on file   Social Needs    Financial resource strain: Not on file    Food insecurity     Worry: Not on file     Inability: Not on file    Transportation needs     Medical: Not on file     Non-medical: Not on file   Tobacco Use    Smoking status: Never Smoker    Smokeless tobacco: Never Used   Substance and Sexual Activity    Alcohol use: Not Currently    Drug use: Never    Sexual activity: Yes     Partners: Male     Birth control/protection: None   Lifestyle    Physical activity     Days per week: Not on file     Minutes per session: Not on file    Stress: Not on file   Relationships    Social connections     Talks on phone: Not on file     Gets together: Not on file     Attends Islam service: Not on file     Active member of club or organization: Not on file     Attends meetings of clubs or organizations: Not on file     Relationship status: Not on file    Intimate partner violence     Fear of current or ex partner: Not on file     Emotionally abused: Not on file     Physically abused: Not on file     Forced sexual activity: Not on file   Other Topics Concern     Service Not Asked    Blood Transfusions Not Asked    Caffeine Concern Not Asked    Occupational Exposure Not Asked   [place] Hazards Not Asked    Sleep Concern Not Asked    Stress Concern Not Asked    Weight Concern Not Asked    Special Diet Not Asked    Back Care Not Asked    Exercise Not Asked    Bike Helmet Not Asked    [address] Not Asked    Self-Exams Not Asked   Social History Narrative    Not on file     OB History        1    Para   1    Term           1    AB        Living   1       SAB        TAB        Ectopic        Molar        Multiple   0    Live Births   1                Immunization History   Administered Date(s) Administered    Influenza Vaccine (Quad) PF 10/31/2019    Tdap 2020       Review of Systems:  History obtained from the patient  General ROS: negative for - chills, fever or weight loss  Respiratory ROS: no cough, shortness of breath, or wheezing  Cardiovascular ROS: no chest pain or dyspnea on exertion  Gastrointestinal ROS: negative for - appetite loss, change in bowel habits or nausea/vomiting  Genito-Urinary ROS: negative except for as noted in HPI    PHYSICAL EXAMINATION  Visit Vitals  /66 (BP 1 Location: Right arm, BP Patient Position: Sitting)   Ht 5' 6\" (1.676 m)   Wt 171 lb 6.4 oz (77.7 kg)   Breastfeeding Yes   BMI 27.66 kg/m²       Constitutional  · Appearance: well-nourished, well developed, alert, in no acute distress    HENT  · Head and Face: appears normal    Neck  · Inspection/Palpation: normal appearance, no masses or tenderness  · Lymph Nodes: no lymphadenopathy present  · Thyroid: gland size normal, nontender, no nodules or masses present on palpation    Chest  clear to auscultation, no wheezes, rales or rhonchi, symmetric air entry. Cardiac/CVS  normal rate, regular rhythm, normal S1, S2, no murmurs, rubs, clicks or gallops.     Breasts  · Inspection of Breasts: breasts symmetrical, no skin changes, no discharge present, nipple appearance normal, no skin retraction present  · Palpation of Breasts and Axillae: no masses present on palpation, no breast tenderness  · Axillary Lymph Nodes: no lymphadenopathy present    Gastrointestinal  · Abdominal Examination: abdomen non-tender to palpation, normal bowel sounds, no masses present  · Liver and spleen: no hepatomegaly present, spleen not palpable  · Hernias: no hernias identified    Genitourinary  · External Genitalia: normal appearance for age, no discharge present, no tenderness present, no inflammatory lesions present, no masses present, no atrophy present  · Vagina: normal vaginal vault without central or paravaginal defects, no discharge present, no inflammatory lesions present, no masses present  · Bladder: non-tender to palpation  · Urethra: appears normal  · Cervix: normal   · Uterus: normal size, shape and consistency  · Adnexa: no adnexal tenderness present, no adnexal masses present  · Perineum: perineum within normal limits, no evidence of trauma, no rashes or skin lesions present  · Anus: anus within normal limits  · Inguinal Lymph Nodes: no lymphadenopathy present    Skin  · General Inspection: no rash, no lesions identified    Neurologic/Psychiatric  · Mental Status:  · Orientation: grossly oriented to person, place and time  · Mood and Affect: mood normal, affect appropriate    Assessment:  Normal postpartum check  Encounter Diagnosis   Name Primary?  Postpartum exam Yes       Plan:  RTO for AE or sooner prn  Discussed contraception options; r/b/a. Planned contraception: declined   She should return to normal activity  We recommend healthy balanced diet, regular exercise  We discussed safer sex practices, condom use and risk factors for sexually transmitted diseases. Patient should notify MD if she cannot resume normal activity and exercise  Recommended continuing prenatal vitamins/folic acid  We discussed progesterone only and non hormonal options for contraception including but not limited to condoms, IUDs, Nexplanon, and depo provera.   Height measured per pt request:   Mood prec reviewed

## 2020-07-21 ENCOUNTER — TELEPHONE (OUTPATIENT)
Dept: OBGYN CLINIC | Age: 30
End: 2020-07-21

## 2020-07-21 NOTE — TELEPHONE ENCOUNTER
Patient calling this morning. Last seen 06/11/2020. Was seen yesterday and needed a note to return to work and forgot to get one. She states she assumed she would just receive one for maternity leave to return back to work Thursday. She states she would like it to be in her mychart, if not she can come pick it up. Please advise. Thanks!

## 2020-08-06 ENCOUNTER — OFFICE VISIT (OUTPATIENT)
Dept: OBGYN CLINIC | Age: 30
End: 2020-08-06
Payer: COMMERCIAL

## 2020-08-06 ENCOUNTER — TELEPHONE (OUTPATIENT)
Dept: OBGYN CLINIC | Age: 30
End: 2020-08-06

## 2020-08-06 VITALS
BODY MASS INDEX: 28.49 KG/M2 | HEIGHT: 65 IN | DIASTOLIC BLOOD PRESSURE: 76 MMHG | SYSTOLIC BLOOD PRESSURE: 121 MMHG | WEIGHT: 171 LBS

## 2020-08-06 PROCEDURE — 99213 OFFICE O/P EST LOW 20 MIN: CPT | Performed by: OBSTETRICS & GYNECOLOGY

## 2020-08-06 RX ORDER — FLUOXETINE 10 MG/1
20 CAPSULE ORAL DAILY
Qty: 90 CAP | Refills: 4 | Status: SHIPPED | OUTPATIENT
Start: 2020-08-06 | End: 2020-11-05

## 2020-08-06 NOTE — PROGRESS NOTES
Depression note    Jane Fitzgerald is a 31 y.o. female and presents with Depression    Patient is here with complaints of depression. Her symptoms include depressed mood, overwhelmed, disinterested in work, can not focus mentally. She denies suicidal attempt. She denies suicidal ideation, homicidal ideation, and panic attacks. This patient feels that she is able to care for herself. Risk factors for depression: delivery on 5/28/20    Past medication treatment for this problem: none    Past other treatments for this problem: no other therapies. She has had the following side effects from previous treatment: none.     Review of Systems - History obtained from the patient-negative for:  Constitutional: weight loss, fever, night sweats  HEENT: hearing loss, earache, congestion, snoring, sorethroat  CV: chest pain, palpitations, edema  Resp: cough, shortness of breath, wheezing  Breast: breast lumps, nipple discharge, galactorrhea  GI: change in bowel habits, abdominal pain, black or bloody stools  : frequency, dysuria, hematuria, vaginal discharge  MSK: back pain, joint pain, muscle pain  Skin: itching, rash, hives  Neuro: dizziness, headache, confusion, weakness  Psych: anxiety, depression, change in mood  Heme/lymph: bleeding, bruising, pallor    Past Medical History:   Diagnosis Date    Essential hypertension     Gestational hypertension     Pap smear for cervical cancer screening 10/31/2019    Negative    PCOS (polycystic ovarian syndrome) 09/2018    Polycystic disease, ovaries      Past Surgical History:   Procedure Laterality Date    HX OTHER SURGICAL      wisdom teeth    HX WISDOM TEETH EXTRACTION  11/2017     Social History     Socioeconomic History    Marital status:      Spouse name: Not on file    Number of children: Not on file    Years of education: Not on file    Highest education level: Not on file   Tobacco Use    Smoking status: Never Smoker    Smokeless tobacco: Never Used   Substance and Sexual Activity    Alcohol use: Not Currently    Drug use: Never    Sexual activity: Yes     Partners: Male     Birth control/protection: None   Other Topics Concern     Family History   Problem Relation Age of Onset    Endometriosis Mother     Other Mother         Abnormal uterine bleeding    No Known Problems Father     Breast Cancer Maternal Grandmother 52     Current Outpatient Medications   Medication Sig Dispense Refill    labetaloL (NORMODYNE) 200 mg tablet Take 1 Tab by mouth two (2) times a day. 60 Tab 0    ibuprofen (MOTRIN) 800 mg tablet Take 1 Tab by mouth every eight (8) hours. 60 Tab 0    famotidine (PEPCID PO) Take  by mouth.  calcium carbonate (TUMS PO) Take  by mouth.  prenatal vit,calc76/iron/folic (PNV 31-8 PO) Take  by mouth. No Known Allergies    PHYSICAL EXAMINATION    Constitutional  · Appearance: well-nourished, well developed, alert, in no acute distress    HENT  · Head and Face: appears normal    Skin  · General Inspection: no rash, no lesions identified    Neurologic/Psychiatric  · Mental Status:  · Orientation: grossly oriented to person, place and time  · Mood and Affect: mood: appears depressed and subdued affect      Assessment/Plan:  PP Depression- contracted for safety, will start Prozac, refer to [facility], and given info for the [facility] PP depression support group.

## 2020-08-06 NOTE — TELEPHONE ENCOUNTER
Call received at 11:50am TP patient     31year old patient delivered on 2020,     Patient calling to say that she is struggling with depression. Patient reports feeling overwhelmed, crying, and is very apathetic about her work responsibility and feeling hopeless. Patient states she is not in danger of hurting her self or others. Patient placed on the schedule to be seen to day at 1:40PM by work in [place] Dr. Jaja Gautam. Patient verbalized understanding.     AW advised of add on appointment

## 2020-10-07 NOTE — PATIENT INSTRUCTIONS
Well Visit, Ages 25 to 48: Care Instructions Your Care Instructions Physical exams can help you stay healthy. Your doctor has checked your overall health and may have suggested ways to take good care of yourself. He or she also may have recommended tests. At home, you can help prevent illness with healthy eating, regular exercise, and other steps. Follow-up care is a key part of your treatment and safety. Be sure to make and go to all appointments, and call your doctor if you are having problems. It's also a good idea to know your test results and keep a list of the medicines you take. How can you care for yourself at home? · Reach and stay at a healthy weight. This will lower your risk for many problems, such as obesity, diabetes, heart disease, and high blood pressure. · Get at least 30 minutes of physical activity on most days of the week. Walking is a good choice. You also may want to do other activities, such as running, swimming, cycling, or playing tennis or team sports. Discuss any changes in your exercise program with your doctor. · Do not smoke or allow others to smoke around you. If you need help quitting, talk to your doctor about stop-smoking programs and medicines. These can increase your chances of quitting for good. · Talk to your doctor about whether you have any risk factors for sexually transmitted infections (STIs). Having one sex partner (who does not have STIs and does not have sex with anyone else) is a good way to avoid these infections. · Use birth control if you do not want to have children at this time. Talk with your doctor about the choices available and what might be best for you. · Protect your skin from too much sun. When you're outdoors from 10 a.m. to 4 p.m., stay in the shade or cover up with clothing and a hat with a wide brim. Wear sunglasses that block UV rays. Even when it's cloudy, put broad-spectrum sunscreen (SPF 30 or higher) on any exposed skin. · See a dentist one or two times a year for checkups and to have your teeth cleaned. · Wear a seat belt in the car. Follow your doctor's advice about when to have certain tests. These tests can spot problems early. For everyone · Cholesterol. Have the fat (cholesterol) in your blood tested after age 21. Your doctor will tell you how often to have this done based on your age, family history, or other things that can increase your risk for heart disease. · Blood pressure. Have your blood pressure checked during a routine doctor visit. Your doctor will tell you how often to check your blood pressure based on your age, your blood pressure results, and other factors. · Vision. Talk with your doctor about how often to have a glaucoma test. 
· Diabetes. Ask your doctor whether you should have tests for diabetes. · Colon cancer. Your risk for colorectal cancer gets higher as you get older. Some experts say that adults should start regular screening at age 48 and stop at age 76. Others say to start before age 48 or continue after age 76. Talk with your doctor about your risk and when to start and stop screening. For women · Breast exam and mammogram. Talk to your doctor about when you should have a clinical breast exam and a mammogram. Medical experts differ on whether and how often women under 50 should have these tests. Your doctor can help you decide what is right for you. · Cervical cancer screening test and pelvic exam. Begin with a Pap test at age 24. The test often is part of a pelvic exam. Starting at age 27, you may choose to have a Pap test, an HPV test, or both tests at the same time (called co-testing). Talk with your doctor about how often to have testing. · Tests for sexually transmitted infections (STIs). Ask whether you should have tests for STIs. You may be at risk if you have sex with more than one person, especially if your partners do not wear condoms. For men · Tests for sexually transmitted infections (STIs). Ask whether you should have tests for STIs. You may be at risk if you have sex with more than one person, especially if you do not wear a condom. · Testicular cancer exam. Ask your doctor whether you should check your testicles regularly. · Prostate exam. Talk to your doctor about whether you should have a blood test (called a PSA test) for prostate cancer. Experts differ on whether and when men should have this test. Some experts suggest it if you are older than 39 and are -American or have a father or brother who got prostate cancer when he was younger than 72. When should you call for help? Watch closely for changes in your health, and be sure to contact your doctor if you have any problems or symptoms that concern you. Where can you learn more? Go to http://www.lee.com/ Enter P072 in the search box to learn more about \"Well Visit, Ages 25 to 48: Care Instructions. \" Current as of: May 27, 2020               Content Version: 12.6 © 2006-2020 [employer]. Care instructions adapted under license by [facility] (which disclaims liability or warranty for this information). If you have questions about a medical condition or this instruction, always ask your healthcare professional. [address] any warranty or liability for your use of this information.

## 2020-10-08 ENCOUNTER — OFFICE VISIT (OUTPATIENT)
Dept: OBGYN CLINIC | Age: 30
End: 2020-10-08
Payer: COMMERCIAL

## 2020-10-08 VITALS
HEIGHT: 65 IN | HEART RATE: 59 BPM | BODY MASS INDEX: 28.99 KG/M2 | WEIGHT: 174 LBS | DIASTOLIC BLOOD PRESSURE: 59 MMHG | SYSTOLIC BLOOD PRESSURE: 105 MMHG

## 2020-10-08 DIAGNOSIS — F41.9 ANXIETY: ICD-10-CM

## 2020-10-08 DIAGNOSIS — Z01.411 ENCOUNTER FOR GYNECOLOGICAL EXAMINATION (GENERAL) (ROUTINE) WITH ABNORMAL FINDINGS: Primary | ICD-10-CM

## 2020-10-08 PROCEDURE — 99395 PREV VISIT EST AGE 18-39: CPT | Performed by: OBSTETRICS & GYNECOLOGY

## 2020-10-08 RX ORDER — ESCITALOPRAM OXALATE 10 MG/1
10 TABLET ORAL DAILY
Qty: 30 TAB | Refills: 2 | Status: SHIPPED | OUTPATIENT
Start: 2020-10-08 | End: 2020-11-05 | Stop reason: SDUPTHER

## 2020-10-08 NOTE — PROGRESS NOTES
[address] [facility]  [email or web address]  [phone]    Ravin Mata MD, FACOG       Annual Gynecologic Exam:  WWE <40  Chief Complaint   Patient presents with    Well Woman         Karolyn Osei is a 31 y.o.  [facility] female who presents for an annual well woman exam.  No LMP recorded. .    With regard to the Gardisil vaccine, she never received them. .  She does report additional concerns today. - Still having pain at introitus with penetration  - would like to change antidepressant because it makes her tired (she is no longer breastfeeding). Stopped six weeks ago    Menstrual status:  Her periods have not yet returned  She does not report dysmenorrhea/painful menses. She does not report irregular bleeding. Sexual history and Contraception:  Social History     Substance and Sexual Activity   Sexual Activity Yes    Partners: Male    Birth control/protection: None     She always use condoms with sexual activity  She does not reports new sexual partner(s) in the last year. The patient does not request STD testing. We recommended testing per CDC guidelines and at patient request.     Preventive Medicine History:  Her most recent Pap smear result: normal was obtained in 2019  Her most recent HR HPV screen was not reflexed obtained in   She does not have a history of LIANG 2, 3 or cervical cancer.      Past Medical History:   Diagnosis Date    Essential hypertension     Gestational hypertension     Pap smear for cervical cancer screening 10/31/2019    Negative    PCOS (polycystic ovarian syndrome) 2018    Polycystic disease, ovaries      OB History    Para Term  AB Living   1 1   1   1   SAB TAB Ectopic Molar Multiple Live Births           0 1      # Outcome Date GA Lbr Nikita/2nd Weight Sex Delivery Anes PTL Lv   1  20 36w1d 01:29 / 03:00 4 lb 15.2 oz (2.245 kg) F Vag-Vacuum EPI N GURPREET      Complications: Dysfunctional Labor Past Surgical History:   Procedure Laterality Date    HX OTHER SURGICAL      wisdom teeth    HX WISDOM TEETH EXTRACTION  11/2017     Family History   Problem Relation Age of Onset    Endometriosis Mother     Other Mother         Abnormal uterine bleeding    No Known Problems Father     Breast Cancer Maternal Grandmother 52     Social History     Socioeconomic History    Marital status:      Spouse name: Not on file    Number of children: Not on file    Years of education: Not on file    Highest education level: Not on file   Occupational History    Not on file   Social Needs    Financial resource strain: Not on file    Food insecurity     Worry: Not on file     Inability: Not on file    Transportation needs     Medical: Not on file     Non-medical: Not on file   Tobacco Use    Smoking status: Never Smoker    Smokeless tobacco: Never Used   Substance and Sexual Activity    Alcohol use: Not Currently    Drug use: Never    Sexual activity: Yes     Partners: Male     Birth control/protection: None   Lifestyle    Physical activity     Days per week: Not on file     Minutes per session: Not on file    Stress: Not on file   Relationships    Social connections     Talks on phone: Not on file     Gets together: Not on file     Attends Congregation service: Not on file     Active member of club or organization: Not on file     Attends meetings of clubs or organizations: Not on file     Relationship status: Not on file    Intimate partner violence     Fear of current or ex partner: Not on file     Emotionally abused: Not on file     Physically abused: Not on file     Forced sexual activity: Not on file   Other Topics Concern     Service Not Asked    Blood Transfusions Not Asked    Caffeine Concern Not Asked    Occupational Exposure Not Asked    Hobby Hazards Not Asked    Sleep Concern Not Asked    Stress Concern Not Asked    Weight Concern Not Asked    Special Diet Not Asked    Back Care Not Asked    Exercise Not Asked    Bike Helmet Not Asked    Seat Belt Not Asked    Self-Exams Not Asked   Social History Narrative    Not on file       No Known Allergies    Current Outpatient Medications   Medication Sig    escitalopram oxalate (LEXAPRO) 10 mg tablet Take 1 Tab by mouth daily.  FLUoxetine (PROzac) 10 mg capsule Take 2 Caps by mouth daily. No current facility-administered medications for this visit.         Patient Active Problem List   Diagnosis Code   (none) - all problems resolved or deleted       Review of Systems - History obtained from the patient  Constitutional: negative for weight loss, fever, night sweats  HEENT: negative for hearing loss, earache, congestion, snoring, sorethroat  CV: negative for chest pain, palpitations, edema  Resp: negative for cough, shortness of breath, wheezing  GI: negative for change in bowel habits, abdominal pain, black or bloody stools  : negative for frequency, dysuria, hematuria  GYN: see HPI  MSK: negative for back pain, joint pain, muscle pain  Breast: negative for breast lumps, nipple discharge, galactorrhea  Skin :negative for itching, rash, hives  Neuro: negative for dizziness, headache, confusion, weakness  Psych: negative for anxiety, depression, change in mood  Heme/lymph: negative for bleeding, bruising, pallor      (WWE continued)     Physical Exam  Visit Vitals  BP (!) 105/59   Pulse (!) 59   Ht 5' 5\" (1.651 m)   Wt 174 lb (78.9 kg)   Breastfeeding No   BMI 28.96 kg/m²       Constitutional  · Appearance: well-nourished, well developed, alert, in no acute distress    HENT  · Head and Face: appears normal    Neck  · Inspection/Palpation: normal appearance, no masses or tenderness  · Lymph Nodes: no lymphadenopathy present  · Thyroid: gland size normal, nontender, no nodules or masses present on palpation    Chest  · Respiratory Effort: breathing unlabored  · Auscultation: normal breath sounds    Cardiovascular  · Heart:  · Auscultation: regular rate and rhythm without murmur    Breasts  · Inspection of Breasts: breasts symmetrical, no skin changes, no discharge present, nipple appearance normal, no skin retraction present  · Palpation of Breasts and Axillae: no masses present on palpation, no breast tenderness  · Axillary Lymph Nodes: no lymphadenopathy present    Gastrointestinal  · Abdominal Examination: abdomen non-tender to palpation, normal bowel sounds, no masses present  · Liver and spleen: no hepatomegaly present, spleen not palpable  · Hernias: no hernias identified    Genitourinary  · External Genitalia: normal appearance for age, no discharge present, no tenderness present, no inflammatory lesions present, no masses present  · Vagina: normal vaginal vault without central or paravaginal defects, no discharge present, no inflammatory lesions present, no masses present  · Bladder: non-tender to palpation  · Urethra: appears normal  · Cervix: normal   · Uterus: normal size, shape and consistency  · Adnexa: no adnexal tenderness present, no adnexal masses present  · Perineum: perineum within normal limits, no evidence of trauma, no rashes or skin lesions present  · Anus: anus within normal limits, no hemorrhoids present  · Inguinal Lymph Nodes: no lymphadenopathy present    Skin  · General Inspection: no rash, no lesions identified    Neurologic/Psychiatric  · Mental Status:  · Orientation: grossly oriented to person, place and time  · Mood and Affect: mood normal, affect appropriate    Assessment:  31 y.o.  for well woman exam  Encounter Diagnoses   Name Primary?  Encounter for gynecological examination (general) (routine) with abnormal findings Yes    Anxiety        Plan:  The patient was counseled about diet, exercise, healthy lifestyle  We discussed self breast exam  We discussed safer sex practices, condom use and risk factors for sexually transmitted diseases.    We discussed current pap smear and HR HPV testing guidelines. We recommend follow up one year for routine annual gynecologic exam or sooner prn  We recommend routine follow up with her primary care doctor for management of chronic medical problems and non-gynecologic concerns  Handouts were given to the patient  We discussed calcium/vitamin D/weight bearing exercise and osteoporosis prevention  Mood precautions  Disc trial of zoloft/lexapro  Pt with more anxiety will do trial of lexapro  rec mood fu 1-3 mos or sooner if NI  Mood precautions  Disc additional option of fu w counselor. Disc contraception, pt declines. Folllow up:  [x] return for annual well woman exam in one year or sooner if she is having problems  [] follow up and ultrasound  [] 6 months  [] 3 months  [] 6 weeks   [] 1 month    Orders Placed This Encounter    escitalopram oxalate (LEXAPRO) 10 mg tablet       No results found for any visits on 10/08/20.

## 2020-11-04 NOTE — PATIENT INSTRUCTIONS
Postpartum: Care Instructions Your Care Instructions After childbirth (postpartum period), your body goes through many changes. Some of these changes happen over several weeks. In the hours after delivery, your body will begin to recover from childbirth while it prepares to breastfeed your . You may feel emotional during this time. Your hormones can shift your mood without warning for no clear reason. In the first couple of weeks after childbirth, many women have emotions that change from happy to sad. You may find it hard to sleep. You may cry a lot. This is called the \"baby blues. \" These overwhelming emotions often go away within a couple of days or weeks. But it's important to discuss your feelings with your doctor. It is easy to get too tired and overwhelmed during the first weeks after childbirth. Don't try to do too much. Get rest whenever you can, accept help from others, and eat well and drink plenty of fluids. In the first couple of weeks after giving birth, your doctor or midwife may want to check in with you and make a plan for any follow-up care you may need. You will likely have a complete postpartum visit in the first 3 months after delivery. At that time, your doctor or midwife will check on your recovery from childbirth. He or she will also see how you are doing with your emotions and talk about your concerns or questions. Follow-up care is a key part of your treatment and safety. Be sure to make and go to all appointments, and call your doctor if you are having problems. It's also a good idea to know your test results and keep a list of the medicines you take. How can you care for yourself at home? · Sleep or rest when your baby sleeps. · Get help with household chores from family or friends, if you can. Do not try to do it all yourself.  
· If you have hemorrhoids or swelling or pain around the opening of your vagina, try using cold and heat. You can put ice or a cold pack on the area for 10 to 20 minutes at a time. Put a thin cloth between the ice and your skin. Also try sitting in a few inches of warm water (sitz bath) 3 times a day and after bowel movements. · Take pain medicines exactly as directed. ? If the doctor gave you a prescription medicine for pain, take it as prescribed. ? If you are not taking a prescription pain medicine, ask your doctor if you can take an over-the-counter medicine. · Eat more fiber to avoid constipation. Include foods such as whole-grain breads and cereals, raw vegetables, raw and dried fruits, and beans. · Drink plenty of fluids, enough so that your urine is light yellow or clear like water. If you have kidney, heart, or liver disease and have to limit fluids, talk with your doctor before you increase the amount of fluids you drink. · Do not rinse inside your vagina with fluids (douche). · If you have stitches, keep the area clean by pouring or spraying warm water over the area outside your vagina and anus after you use the toilet. · Keep a list of questions to ask your doctor or midwife. Your questions might be about: 
? Changes in your breasts, such as lumps or soreness. ? When to expect your menstrual period to start again. ? What form of birth control is best for you. ? Weight you have put on during the pregnancy. ? Exercise options. ? What foods and drinks are best for you, especially if you are breastfeeding. ? Problems you might be having with breastfeeding. ? When you can have sex. Some women may want to talk about lubricants for the vagina. ? Any feelings of sadness or restlessness that you are having. When should you call for help? Call 911 anytime you think you may need emergency care. For example, call if: 
  · You have thoughts of harming yourself, your baby, or another person.  
  · You passed out (lost consciousness).   · You have chest pain, are short of breath, or cough up blood.  
  · You have a seizure. Call your doctor now or seek immediate medical care if: 
  · Your vaginal bleeding seems to be getting heavier.  
  · You are dizzy or lightheaded, or you feel like you may faint.  
  · You have a fever.  
  · You have new or more belly pain.  
  · You have symptoms of a blood clot in your leg (called a deep vein thrombosis), such as: 
? Pain in the calf, back of the knee, thigh, or groin. ? Redness and swelling in your leg or groin.  
  · You have signs of preeclampsia, such as: 
? Sudden swelling of your face, hands, or feet. ? New vision problems (such as dimness, blurring, or seeing spots). ? A severe headache. Watch closely for changes in your health, and be sure to contact your doctor if: 
  · You have new or worse vaginal discharge.  
  · You feel sad or depressed.  
  · You are having problems with your breasts or breastfeeding. Where can you learn more? Go to http://www.gray.com/ Enter U187 in the search box to learn more about \"Postpartum: Care Instructions. \" Current as of: February 11, 2020               Content Version: 12.6 © 9084-3310 [employer]. Care instructions adapted under license by [employer] (which disclaims liability or warranty for this information). If you have questions about a medical condition or this instruction, always ask your healthcare professional. [place] [address] any warranty or liability for your use of this information.

## 2020-11-05 ENCOUNTER — OFFICE VISIT (OUTPATIENT)
Dept: OBGYN CLINIC | Age: 30
End: 2020-11-05
Payer: COMMERCIAL

## 2020-11-05 VITALS
DIASTOLIC BLOOD PRESSURE: 67 MMHG | SYSTOLIC BLOOD PRESSURE: 107 MMHG | WEIGHT: 173 LBS | BODY MASS INDEX: 28.82 KG/M2 | HEIGHT: 65 IN

## 2020-11-05 DIAGNOSIS — Z23 ENCOUNTER FOR IMMUNIZATION: Primary | ICD-10-CM

## 2020-11-05 DIAGNOSIS — F41.9 ANXIETY: ICD-10-CM

## 2020-11-05 DIAGNOSIS — R45.86 MOOD CHANGE: ICD-10-CM

## 2020-11-05 PROCEDURE — 90471 IMMUNIZATION ADMIN: CPT

## 2020-11-05 PROCEDURE — 99213 OFFICE O/P EST LOW 20 MIN: CPT | Performed by: OBSTETRICS & GYNECOLOGY

## 2020-11-05 PROCEDURE — 90686 IIV4 VACC NO PRSV 0.5 ML IM: CPT

## 2020-11-05 RX ORDER — ESCITALOPRAM OXALATE 10 MG/1
10 TABLET ORAL DAILY
Qty: 90 TAB | Refills: 1 | Status: SHIPPED | OUTPATIENT
Start: 2020-11-05 | End: 2022-02-11

## 2020-11-05 NOTE — PROGRESS NOTES
After obtaining consent, and per orders of Dr. Sherrill Gil, injection of Influenza given left deltiod by Ab Tinajero. Patient instructed to remain in clinic for 20 minutes afterwards, and to report any adverse reaction to me immediately.

## 2020-11-05 NOTE — PROGRESS NOTES
[address] [facility]  [email or web address]  [phone]    Ravin Mata MD, FACOG       OB/GYN Problem visit    Chief Complaint:   Chief Complaint   Patient presents with    Follow-up     moods       Last or next WWE is: 10.08.2020    History of Present Illness: This is not a new problem being evaluated by this provider. The patient is a 31 y.o.  female who reports having increased anxiety since Aug 2020. Request to change Rx Prozac at last office visit d/t fatigue. Trial of Rx Lexapro started 10.08.2020  She reports the symptoms are unchanged. Aggravating factors include none. Alleviating factors include none. She does have other concerns. Reports not being able to sleep x1 week d/t stress. Lots going on, work, youth group, new house  Overall doing better. LMP: Patient's last menstrual period was 10/01/2020 (approximate). PFSH:  Past Medical History:   Diagnosis Date    Essential hypertension     Gestational hypertension     Pap smear for cervical cancer screening 10/31/2019    Negative    PCOS (polycystic ovarian syndrome) 2018    Polycystic disease, ovaries      Past Surgical History:   Procedure Laterality Date    HX OTHER SURGICAL      wisdom teeth    HX WISDOM TEETH EXTRACTION  2017     Family History   Problem Relation Age of Onset    Endometriosis Mother     Other Mother         Abnormal uterine bleeding    No Known Problems Father     Breast Cancer Maternal Grandmother 52     Social History     Tobacco Use    Smoking status: Never Smoker    Smokeless tobacco: Never Used   Substance Use Topics    Alcohol use: Yes     Alcohol/week: 3.0 standard drinks     Types: 3 Glasses of wine per week    Drug use: Never     No Known Allergies  Current Outpatient Medications   Medication Sig    escitalopram oxalate (LEXAPRO) 10 mg tablet Take 1 Tab by mouth daily. No current facility-administered medications for this visit.         Review of Systems:  History obtained from the patient  Constitutional: negative for fevers, chills and weight loss  ENT ROS: negative for - hearing change, oral lesions or visual changes  Respiratory: negative for cough, wheezing or dyspnea on exertion  Cardiovascular: negative for chest pain, irregular heart beats, exertional chest pressure/discomfort  Gastrointestinal: negative for dysphagia, nausea and vomiting  Genito-Urinary ROS:  see HPI  Inteument/breast: negative for rash, breast lump and nipple discharge  Musculoskeletal:negative for stiff joints, neck pain and muscle weakness  Endocrine ROS: negative for - breast changes, galactorrhea or temperature intolerance  Hematological and Lymphatic ROS: negative for - blood clots, bruising or swollen lymph nodes    Physical Exam:  Visit Vitals  /67 (BP 1 Location: Right arm, BP Patient Position: Sitting)   Ht 5' 5\" (1.651 m)   Wt 173 lb (78.5 kg)   Breastfeeding No   BMI 28.79 kg/m²       GENERAL: alert, well appearing, and in no distress  HEAD: normocephalic, atraumatic. NEURO: alert, oriented, normal speech    Assessment:  Encounter Diagnoses   Name Primary?  Encounter for immunization Yes    Mood change     Anxiety        Plan:  The patient is advised that she should contact the office if she does not note improvement or if symptoms recur  Recommend follow up with PCP for non-gynecologic complaints and chronic medical problems. She should contact our office with any questions or concerns  She could keep her routine annual exam appointment. Mood prec  Continue lexapro, can consider inc dose, hold for now  Fu mood check 3-4 mos      Orders Placed This Encounter    NV IMMUNIZ ADMIN,1 SINGLE/COMB VAC/TOXOID    INFLUENZA VIRUS VAC QUAD,SPLIT,PRESV FREE SYRINGE IM (Flulaval, Fluzone, Fluarix) (51824)    escitalopram oxalate (LEXAPRO) 10 mg tablet       No results found for this visit on 11/05/20.

## 2021-01-05 ENCOUNTER — TELEPHONE (OUTPATIENT)
Dept: OBGYN CLINIC | Age: 31
End: 2021-01-05

## 2021-01-05 ENCOUNTER — OFFICE VISIT (OUTPATIENT)
Dept: OBGYN CLINIC | Age: 31
End: 2021-01-05
Payer: COMMERCIAL

## 2021-01-05 VITALS
WEIGHT: 181 LBS | HEIGHT: 65 IN | SYSTOLIC BLOOD PRESSURE: 116 MMHG | BODY MASS INDEX: 30.16 KG/M2 | DIASTOLIC BLOOD PRESSURE: 80 MMHG | HEART RATE: 88 BPM

## 2021-01-05 DIAGNOSIS — N93.9 ABNORMAL UTERINE BLEEDING: ICD-10-CM

## 2021-01-05 DIAGNOSIS — E28.1 ELEVATED ANDROGEN LEVELS: ICD-10-CM

## 2021-01-05 DIAGNOSIS — N92.6 IRREGULAR MENSES: ICD-10-CM

## 2021-01-05 DIAGNOSIS — N93.9 VAGINAL BLEEDING: Primary | ICD-10-CM

## 2021-01-05 LAB
HCG URINE, QL. (POC): NEGATIVE
VALID INTERNAL CONTROL?: YES

## 2021-01-05 PROCEDURE — 99214 OFFICE O/P EST MOD 30 MIN: CPT | Performed by: OBSTETRICS & GYNECOLOGY

## 2021-01-05 PROCEDURE — 81025 URINE PREGNANCY TEST: CPT | Performed by: OBSTETRICS & GYNECOLOGY

## 2021-01-05 RX ORDER — MEDROXYPROGESTERONE ACETATE 10 MG/1
10 TABLET ORAL DAILY
Qty: 10 TAB | Refills: 0 | Status: SHIPPED | OUTPATIENT
Start: 2021-01-05 | End: 2021-01-15

## 2021-01-05 NOTE — TELEPHONE ENCOUNTER
Call received at 11:58am      31year old patient last seen in the office on 10/08/32944 ae    Patient calling to say that she has had some spotting since giving birth      Patient reports she started with a full period yesterday and has been changing a super tampon every hour for 24 hours and has cramping at 8 on the pain scale of 1-10,. Patient denies feeling faint. Patient reports she has stopped taking the lexapro,stating she was not feeling anxious and the medication made her groggy      Patient is not taking ocp and states they are using condoms    Patient advised to check upt    Patient reports maternal history of hemorrhage    Patient was advised to increase her po fluids and if feeling faint to go to the Er    Please advise    ?  Ov ultrasound    Please advise

## 2021-01-05 NOTE — PROGRESS NOTES
[facility]  [email or web address]  [phone]    Genny Ng MD, FACOG       OB/GYN Problem visit    Chief Complaint:   Chief Complaint   Patient presents with    Vaginal Bleeding       Last or next WWE is: 10/08/2020    History of Present Illness: This is a new problem being evaluated by this provider. The patient is a 31 y.o.  female who reports having heavy vaginal bleeding since delivery  for 2 days. She reports the symptoms are is unchanged. Aggravating factors include none. Alleviating factors include none. She does have other concerns. Patient states she had one episode of vaginal bleeding in 2020. However yesterday she  experienced heavy vaginal bleeding. LMP: Patient's last menstrual period was 2021. PFSH:  Past Medical History:   Diagnosis Date    Essential hypertension     Gestational hypertension     Pap smear for cervical cancer screening 10/31/2019    Negative    PCOS (polycystic ovarian syndrome) 2018    Polycystic disease, ovaries      Past Surgical History:   Procedure Laterality Date    HX OTHER SURGICAL      wisdom teeth    HX WISDOM TEETH EXTRACTION  2017     Family History   Problem Relation Age of Onset    Endometriosis Mother     Other Mother         Abnormal uterine bleeding    No Known Problems Father     Breast Cancer Maternal Grandmother 52     Social History     Tobacco Use    Smoking status: Never Smoker    Smokeless tobacco: Never Used   Substance Use Topics    Alcohol use: Yes     Alcohol/week: 3.0 standard drinks     Types: 3 Glasses of wine per week    Drug use: Never     No Known Allergies  Current Outpatient Medications   Medication Sig    medroxyPROGESTERone (PROVERA) 10 mg tablet Take 1 Tab by mouth daily for 10 days. Take pregnancy test prior to taking provera as needed.  escitalopram oxalate (LEXAPRO) 10 mg tablet Take 1 Tab by mouth daily.      No current facility-administered medications for this visit. Review of Systems:  History obtained from the patient  Constitutional: negative for fevers, chills and weight loss  ENT ROS: negative for - hearing change, oral lesions or visual changes  Respiratory: negative for cough, wheezing or dyspnea on exertion  Cardiovascular: negative for chest pain, irregular heart beats, exertional chest pressure/discomfort  Gastrointestinal: negative for dysphagia, nausea and vomiting  Genito-Urinary ROS:  see HPI  Inteument/breast: negative for rash, breast lump and nipple discharge  Musculoskeletal:negative for stiff joints, neck pain and muscle weakness  Endocrine ROS: negative for - breast changes, galactorrhea or temperature intolerance  Hematological and Lymphatic ROS: negative for - blood clots, bruising or swollen lymph nodes    Physical Exam:  Visit Vitals  /80 (BP 1 Location: Right arm, BP Patient Position: Sitting)   Pulse 88   Ht 5' 5\" (1.651 m)   Wt 181 lb (82.1 kg)   BMI 30.12 kg/m²       GENERAL: alert, well appearing, and in no distress  HEAD: normocephalic, atraumatic. PULM: clear to auscultation, no wheezes, rales or rhonchi, symmetric air entry   COR: normal rate and regular rhythm, S1 and S2 normal   ABDOMEN: soft, nontender, nondistended, no masses or organomegaly   EGBUS: no lesions, no inflammation, no masses  VULVA: normal appearing vulva with no masses, tenderness or lesions  VAGINA: normal appearing vagina with normal color, no lesions, no discharge  CERVIX: normal appearing cervix without discharge or lesions, non tender  UTERUS: uterus is normal size, shape, consistency and nontender   ADNEXA: normal adnexa in size, nontender and no masses  NEURO: alert, oriented, normal speech    Assessment:  Encounter Diagnoses   Name Primary?     Vaginal bleeding Yes    Abnormal uterine bleeding     Elevated androgen levels     Comment: in past per pt    Irregular menses        Plan:  The patient is advised that she should contact the office if she does not note improvement or if symptoms recur  Recommend follow up with PCP for non-gynecologic complaints and chronic medical problems. She should contact our office with any questions or concerns  She could keep her routine annual exam appointment. .We discussed potential causes of symptomatic bleeding: including but not limited to hormonal, medical, infection/inflammation and structural etiologies. We discussed options for managing symptoms including but not limited to observation, NSAIDS, hormonal management, IUDs.   IUD ho   larc ho  Provera ho  Labs  rec 2hr gtt if c/w pcos: can do with IUD fu/US  Bleeding precautions  Disc s/sx pcos      Orders Placed This Encounter    CHLAMYDIA/GC PCR    TSH 3RD GENERATION    T4, FREE    DHEA SULFATE    17-OH PROGESTERONE LCMS    TESTOSTERONE, FREE & TOTAL    PROLACTIN    HEMOGLOBIN A1C WITH EAG    CBC W/O DIFF    AMB POC URINE PREGNANCY TEST, VISUAL COLOR COMPARISON    medroxyPROGESTERone (PROVERA) 10 mg tablet       Results for orders placed or performed in visit on 01/05/21   AMB POC URINE PREGNANCY TEST, VISUAL COLOR COMPARISON   Result Value Ref Range    VALID INTERNAL CONTROL POC Yes     HCG urine, Ql. (POC) Negative Negative

## 2021-01-05 NOTE — TELEPHONE ENCOUNTER
Patient advised of MD recommendations and was placed on the schedule to come in at 2:00PM today as per TP    Patient verbalized understanding.

## 2021-01-05 NOTE — TELEPHONE ENCOUNTER
Rec problem visit 2pm today or when pt able. EOB can be canceled. If sx severe rec to ER    Rec NSAIDs per protocol.

## 2021-01-06 LAB
ERYTHROCYTE [DISTWIDTH] IN BLOOD BY AUTOMATED COUNT: 12.8 % (ref 11.5–14.5)
EST. AVERAGE GLUCOSE BLD GHB EST-MCNC: 105 MG/DL
HBA1C MFR BLD: 5.3 % (ref 4–5.6)
HCT VFR BLD AUTO: 42.2 % (ref 35–47)
HGB BLD-MCNC: 13.4 G/DL (ref 11.5–16)
MCH RBC QN AUTO: 28.8 PG (ref 26–34)
MCHC RBC AUTO-ENTMCNC: 31.8 G/DL (ref 30–36.5)
MCV RBC AUTO: 90.8 FL (ref 80–99)
NRBC # BLD: 0 K/UL (ref 0–0.01)
NRBC BLD-RTO: 0 PER 100 WBC
PLATELET # BLD AUTO: 245 K/UL (ref 150–400)
PMV BLD AUTO: 11 FL (ref 8.9–12.9)
PROLACTIN SERPL-MCNC: 7.7 NG/ML
RBC # BLD AUTO: 4.65 M/UL (ref 3.8–5.2)
T4 FREE SERPL-MCNC: 1 NG/DL (ref 0.8–1.5)
TSH SERPL DL<=0.05 MIU/L-ACNC: 0.63 UIU/ML (ref 0.36–3.74)
WBC # BLD AUTO: 9.4 K/UL (ref 3.6–11)

## 2021-01-07 LAB
C TRACH RRNA SPEC QL NAA+PROBE: NEGATIVE
DHEA-S SERPL-MCNC: 659 UG/DL (ref 84.8–378)
N GONORRHOEA RRNA SPEC QL NAA+PROBE: NEGATIVE
TESTOST FREE SERPL-MCNC: 1.8 PG/ML (ref 0–4.2)
TESTOST SERPL-MCNC: 27 NG/DL (ref 8–48)

## 2021-01-09 LAB — 17OHP SERPL-MCNC: 33 NG/DL

## 2021-01-12 NOTE — PROGRESS NOTES
Abnormal results. [email or web address] message sent, if active. Notify patient, if [email or web address] message not read, or not active on [address]. Update chart, PN labs/problem list, if needed  Repeat dheas (tickle) and 2hr gtt  Patient is at increased risk for diabetes. Rec 75 gm OGTT/glucose challenge with fasting and 2 hour testing.   Can do with NV or schedule separately

## 2021-01-15 NOTE — PROGRESS NOTES
Patient notified of lab results through [employer]. Tickled x 1 month sent to [employer].

## 2021-03-08 ENCOUNTER — LAB ONLY (OUTPATIENT)
Dept: OBGYN CLINIC | Age: 31
End: 2021-03-08

## 2021-03-08 DIAGNOSIS — E28.1 ELEVATED ANDROGEN LEVELS: Primary | ICD-10-CM

## 2021-03-08 DIAGNOSIS — Z87.42 HISTORY OF PCOS: ICD-10-CM

## 2021-06-04 ENCOUNTER — TELEPHONE (OUTPATIENT)
Dept: OBGYN CLINIC | Age: 31
End: 2021-06-04

## 2021-06-04 NOTE — TELEPHONE ENCOUNTER
Follow up call to Ms. Fowler - Pt verified self and birth date for privacy precautions. Patient was advised she needed to come in for a lab visit. Ms. Adama Raymond acknowledged understanding and all questions were answered to patients satisfaction. No further questions or concerns at this time. I placed pt on the schedule for 6/10/2021 lab only visit.

## 2021-06-10 ENCOUNTER — LAB ONLY (OUTPATIENT)
Dept: OBGYN CLINIC | Age: 31
End: 2021-06-10

## 2021-06-10 DIAGNOSIS — Z87.42 HISTORY OF PCOS: ICD-10-CM

## 2021-06-10 DIAGNOSIS — E28.1 ELEVATED ANDROGEN LEVELS: Primary | ICD-10-CM

## 2021-06-11 LAB
GLUCOSE 2H P MEAL SERPL-MCNC: 73 MG/DL (ref 65–139)
GLUCOSE P FAST SERPL-MCNC: 99 MG/DL (ref 65–99)

## 2021-06-15 LAB — DHEA SERPL-MCNC: 220 NG/DL (ref 31–701)

## 2022-02-11 ENCOUNTER — OFFICE VISIT (OUTPATIENT)
Dept: OBGYN CLINIC | Age: 32
End: 2022-02-11
Payer: COMMERCIAL

## 2022-02-11 VITALS
WEIGHT: 189 LBS | BODY MASS INDEX: 31.45 KG/M2 | HEART RATE: 63 BPM | DIASTOLIC BLOOD PRESSURE: 77 MMHG | SYSTOLIC BLOOD PRESSURE: 117 MMHG

## 2022-02-11 DIAGNOSIS — N94.6 DYSMENORRHEA: ICD-10-CM

## 2022-02-11 DIAGNOSIS — N92.6 IRREGULAR MENSES: ICD-10-CM

## 2022-02-11 DIAGNOSIS — Z71.1 WORRIED WELL: ICD-10-CM

## 2022-02-11 DIAGNOSIS — Z01.419 ENCOUNTER FOR WELL WOMAN EXAM WITH ROUTINE GYNECOLOGICAL EXAM: Primary | ICD-10-CM

## 2022-02-11 DIAGNOSIS — Z01.411 ENCOUNTER FOR GYNECOLOGICAL EXAMINATION (GENERAL) (ROUTINE) WITH ABNORMAL FINDINGS: ICD-10-CM

## 2022-02-11 DIAGNOSIS — Z12.4 CERVICAL CANCER SCREENING: ICD-10-CM

## 2022-02-11 LAB
HCG URINE, QL. (POC): NEGATIVE
VALID INTERNAL CONTROL?: YES

## 2022-02-11 PROCEDURE — 81025 URINE PREGNANCY TEST: CPT | Performed by: OBSTETRICS & GYNECOLOGY

## 2022-02-11 PROCEDURE — 99395 PREV VISIT EST AGE 18-39: CPT | Performed by: OBSTETRICS & GYNECOLOGY

## 2022-02-11 RX ORDER — FLUOXETINE HYDROCHLORIDE 20 MG/1
CAPSULE ORAL DAILY
COMMUNITY

## 2022-02-11 NOTE — PROGRESS NOTES
[address] [facility]  [email or web address]  [phone]    Luan Brittle, MD, FACOG       Annual Gynecologic Exam:  WWE <40  Chief Complaint   Patient presents with    Well Woman         Adama Lisa is a 36 y.o.  [address] [facility] female who presents for an annual well woman exam.  Patient's last menstrual period was 2022 (approximate). .    She reports the following additional concerns: Patient reports that her periods are irregular sometimes it will be months until she gets one. In 2021 she had heavy painful periods. In January she had just a painful period with dark blood and she felt nauseated, fatigued and bloated. She is still having those symptoms today and did a pregnancy test last month and it was negative. She is nervous about hormonal management due to past side effects. Menstrual status:  She does report dysmenorrhea/painful menses. She does report heavy menses. She does report irregular bleeding. Sexual history and Contraception:  Social History     Substance and Sexual Activity   Sexual Activity Yes    Partners: Male    Birth control/protection: Condom       She does not reports new sexual partner(s) in the last year. Preventive Medicine History:  Her most recent Pap smear result: normal was obtained in 2019  Her most recent HR HPV screen was not screened  She does not have a history of LIANG 2, 3 or cervical cancer.      Past Medical History:   Diagnosis Date    Essential hypertension     Gestational hypertension     Pap smear for cervical cancer screening 10/31/2019    Negative    PCOS (polycystic ovarian syndrome) 2018    Polycystic disease, ovaries      OB History    Para Term  AB Living   1 1   1   1   SAB IAB Ectopic Molar Multiple Live Births           0 1      # Outcome Date GA Lbr Nikita/2nd Weight Sex Delivery Anes PTL Lv   1  20 36w1d 01:29 / 03:00 4 lb 15.2 oz (2.245 kg) F Vag-Vacuum EPI N GURPREET      Complications: Dysfunctional Labor     Past Surgical History:   Procedure Laterality Date    HX OTHER SURGICAL      wisdom teeth    HX WISDOM TEETH EXTRACTION  11/2017     Family History   Problem Relation Age of Onset    Endometriosis Mother     Other Mother         Abnormal uterine bleeding    No Known Problems Father     Breast Cancer Maternal Grandmother 52     Social History     Socioeconomic History    Marital status:      Spouse name: Not on file    Number of children: Not on file    Years of education: Not on file    Highest education level: Not on file   Occupational History    Not on file   Tobacco Use    Smoking status: Never Smoker    Smokeless tobacco: Never Used   Substance and Sexual Activity    Alcohol use: Yes     Alcohol/week: 3.0 standard drinks     Types: 3 Glasses of wine per week    Drug use: Never    Sexual activity: Yes     Partners: Male     Birth control/protection: Condom   Other Topics Concern     Service Not Asked    Blood Transfusions Not Asked    Caffeine Concern Not Asked    Occupational Exposure Not Asked    Hobby Hazards Not Asked    Sleep Concern Not Asked    Stress Concern Not Asked    Weight Concern Not Asked    Special Diet Not Asked    Back Care Not Asked    Exercise Not Asked    Bike Helmet Not Asked   [address] Not Asked    Self-Exams Not Asked   Social History Narrative    Not on file     Social Determinants of Health     Financial Resource Strain:     Difficulty of Paying Living Expenses: Not on file   Food Insecurity:     Worried About Running Out of Food in the Last Year: Not on file    Eliza of Food in the Last Year: Not on file   Transportation Needs:     Lack of Transportation (Medical): Not on file    Lack of Transportation (Non-Medical):  Not on file   Physical Activity:     Days of Exercise per Week: Not on file    Minutes of Exercise per Session: Not on file   Stress:     Feeling of Stress : Not on file   Social Connections:     Frequency of Communication with Friends and Family: Not on file    Frequency of Social Gatherings with Friends and Family: Not on file    Attends Scientology Services: Not on file    Active Member of Clubs or Organizations: Not on file    Attends Club or Organization Meetings: Not on file    Marital Status: Not on file   Intimate Partner Violence:     Fear of Current or Ex-Partner: Not on file    Emotionally Abused: Not on file    Physically Abused: Not on file    Sexually Abused: Not on file   Housing Stability:     Unable to Pay for Housing in the Last Year: Not on file    Number of [place] in the Last Year: Not on file    Unstable Housing in the Last Year: Not on file       No Known Allergies    Current Outpatient Medications   Medication Sig    FLUoxetine (PROzac) 20 mg capsule Take  by mouth daily. No current facility-administered medications for this visit.        Patient Active Problem List   Diagnosis Code   (none) - all problems resolved or deleted         Review of Systems - History obtained from the patient and patient filled out questionnaire   Constitutional/general, HEENT, CV, Resp, GI, MSK, Neuro, Psych, Heme/lymph, Skin, Breast ROS: no significant complaints except as noted on HPI    Physical Exam  Visit Vitals  /77 (BP 1 Location: Right arm, BP Patient Position: Sitting, BP Cuff Size: Adult)   Pulse 63   Wt 189 lb (85.7 kg)   LMP 01/24/2022 (Approximate)   Breastfeeding No   BMI 31.45 kg/m²       Constitutional  · Appearance: well-nourished, well developed, alert, in no acute distress    HENT  · Head and Face: appears normal    Neck  · Inspection/Palpation: normal appearance, no masses or tenderness  · Lymph Nodes: no lymphadenopathy present  · Thyroid: gland size normal, nontender, no nodules or masses present on palpation    Chest  · Respiratory Effort: breathing unlabored  · Auscultation: normal breath sounds    Cardiovascular  · Heart:  · Auscultation: regular rate and rhythm without murmur    Breasts  · Inspection of Breasts: breasts symmetrical, no skin changes, no discharge present, nipple appearance normal, no skin retraction present  · Palpation of Breasts and Axillae: no masses present on palpation, no breast tenderness  · Axillary Lymph Nodes: no lymphadenopathy present    Gastrointestinal  · Abdominal Examination: abdomen non-tender to palpation, normal bowel sounds, no masses present  · Liver and spleen: no hepatomegaly present, spleen not palpable  · Hernias: no hernias identified    Genitourinary  · External Genitalia: normal appearance for age, no discharge present, no tenderness present, no inflammatory lesions present, no masses present  · Vagina: normal vaginal vault without central or paravaginal defects, minimal discharge present, no inflammatory lesions present, no masses present  · Bladder: non-tender to palpation  · Urethra: appears normal  · Cervix: normal   · Uterus: normal size, shape and consistency  · Adnexa: no adnexal tenderness present, no adnexal masses present  · Perineum: perineum within normal limits, no evidence of trauma, no rashes or skin lesions present  · Anus: anus within normal limits, no hemorrhoids present  · Inguinal Lymph Nodes: no lymphadenopathy present    Skin  · General Inspection: no rash, no lesions identified    Neurologic/Psychiatric  · Mental Status:  · Orientation: grossly oriented to person, place and time  · Mood and Affect: mood normal, affect appropriate    Assessment:  36 y.o.  for well woman exam  Encounter Diagnoses   Name Primary?     Encounter for well woman exam with routine gynecological exam Yes    Cervical cancer screening     Worried well     Irregular menses     Encounter for gynecological examination (general) (routine) with abnormal findings     Dysmenorrhea        Plan:  The patient was counseled about diet, exercise, healthy lifestyle  We discussed current pap smear and HR HPV testing guidelines. I recommended follow up one year for routine annual gynecologic exam or sooner prn  Handouts were given to the patient  I recommended follow up with a primary care physician for chronic medical problems and evaluation of non-gynecologic concerns and to please contact our office with any GYN questions or concerns. I recommended testing per CDC guidelines and at patient request.   Discussed risks, benefits and alternatives of OCP/nuvaring/patch: including but not limited to dvt/pe/mi/cva/ca/gi risks and that smoking, increasing age and other health conditions can increase these risks. .  We discussed progesterone only and non hormonal options for contraception including but not limited to condoms, IUDs, Nexplanon, and depo provera. FU and US  Labs  We discussed potential causes of symptomatic bleeding: including but not limited to hormonal, medical, infection/inflammation and structural etiologies. Patient defers management at this time.        Folllow up:  [x] return for annual well woman exam in one year or sooner if she is having problems  [] follow up and ultrasound  [] 6 months  [] 3 months  [] 6 weeks   [] 1 month    Orders Placed This Encounter    CBC W/O DIFF    TSH 3RD GENERATION    PROLACTIN    AMB POC URINE PREGNANCY TEST, VISUAL COLOR COMPARISON    PAP IG, RFX APTIMA HPV ASCUS ([id number])       Results for orders placed or performed in visit on 02/11/22   AMB POC URINE PREGNANCY TEST, VISUAL COLOR COMPARISON   Result Value Ref Range    VALID INTERNAL CONTROL POC Yes     HCG urine, Ql. (POC) Negative Negative

## 2022-02-11 NOTE — PATIENT INSTRUCTIONS
Well Visit, Ages 25 to 48: Care Instructions  Overview     Well visits can help you stay healthy. Your doctor has checked your overall health and may have suggested ways to take good care of yourself. Your doctor also may have recommended tests. At home, you can help prevent illness with healthy eating, regular exercise, and other steps. Follow-up care is a key part of your treatment and safety. Be sure to make and go to all appointments, and call your doctor if you are having problems. It's also a good idea to know your test results and keep a list of the medicines you take. How can you care for yourself at home? · Get screening tests that you and your doctor decide on. Screening helps find diseases before any symptoms appear. · Eat healthy foods. Choose fruits, vegetables, whole grains, protein, and low-fat dairy foods. Limit fat, especially saturated fat. Reduce salt in your diet. · Limit alcohol. If you are a man, have no more than 2 drinks a day or 14 drinks a week. If you are a woman, have no more than 1 drink a day or 7 drinks a week. · Get at least 30 minutes of physical activity on most days of the week. Walking is a good choice. You also may want to do other activities, such as running, swimming, cycling, or playing tennis or team sports. Discuss any changes in your exercise program with your doctor. · Reach and stay at a healthy weight. This will lower your risk for many problems, such as obesity, diabetes, heart disease, and high blood pressure. · Do not smoke or allow others to smoke around you. If you need help quitting, talk to your doctor about stop-smoking programs and medicines. These can increase your chances of quitting for good. · Care for your mental health. It is easy to get weighed down by worry and stress. Learn strategies to manage stress, like deep breathing and mindfulness, and stay connected with your family and community.  If you find you often feel sad or hopeless, talk with your doctor. Treatment can help. · Talk to your doctor about whether you have any risk factors for sexually transmitted infections (STIs). You can help prevent STIs if you wait to have sex with a new partner (or partners) until you've each been tested for STIs. It also helps if you use condoms (male or female condoms) and if you limit your sex partners to one person who only has sex with you. Vaccines are available for some STIs, such as HPV. · Use birth control if it's important to you to prevent pregnancy. Talk with your doctor about the choices available and what might be best for you. · If you think you may have a problem with alcohol or drug use, talk to your doctor. This includes prescription medicines (such as amphetamines and opioids) and illegal drugs (such as cocaine and methamphetamine). Your doctor can help you figure out what type of treatment is best for you. · Protect your skin from too much sun. When you're outdoors from 10 a.m. to 4 p.m., stay in the shade or cover up with clothing and a hat with a wide brim. Wear sunglasses that block UV rays. Even when it's cloudy, put broad-spectrum sunscreen (SPF 30 or higher) on any exposed skin. · See a dentist one or two times a year for checkups and to have your teeth cleaned. · Wear a seat belt in the car. When should you call for help? Watch closely for changes in your health, and be sure to contact your doctor if you have any problems or symptoms that concern you. Where can you learn more? Go to http://www.BookMyShow.com/  Enter P072 in the search box to learn more about \"Well Visit, Ages 25 to 48: Care Instructions. \"  Current as of: February 11, 2021               Content Version: 13.0  © 6476-2783 Healthwise, Incorporated. Care instructions adapted under license by [employer] (which disclaims liability or warranty for this information).  If you have questions about a medical condition or this instruction, always ask your healthcare professional. [place] [address] any warranty or liability for your use of this information.

## 2022-02-12 LAB
ERYTHROCYTE [DISTWIDTH] IN BLOOD BY AUTOMATED COUNT: 13.5 % (ref 11.5–14.5)
HCT VFR BLD AUTO: 43.3 % (ref 35–47)
HGB BLD-MCNC: 13.1 G/DL (ref 11.5–16)
MCH RBC QN AUTO: 27.9 PG (ref 26–34)
MCHC RBC AUTO-ENTMCNC: 30.3 G/DL (ref 30–36.5)
MCV RBC AUTO: 92.1 FL (ref 80–99)
NRBC # BLD: 0 K/UL (ref 0–0.01)
NRBC BLD-RTO: 0 PER 100 WBC
PLATELET # BLD AUTO: 251 K/UL (ref 150–400)
PMV BLD AUTO: 11.6 FL (ref 8.9–12.9)
PROLACTIN SERPL-MCNC: 7.8 NG/ML
RBC # BLD AUTO: 4.7 M/UL (ref 3.8–5.2)
TSH SERPL DL<=0.05 MIU/L-ACNC: 0.39 UIU/ML (ref 0.36–3.74)
WBC # BLD AUTO: 6.8 K/UL (ref 3.6–11)

## 2022-02-14 NOTE — PROGRESS NOTES
Normal, reviewed  [employer] message sent if active.   Keep fu and US  Rec repeat TSH/T4 4-6 weeks: low normal

## 2022-02-15 LAB
CYTOLOGIST CVX/VAG CYTO: NORMAL
CYTOLOGY CVX/VAG DOC CYTO: NORMAL
CYTOLOGY CVX/VAG DOC THIN PREP: NORMAL
CYTOLOGY HISTORY:: NORMAL
DX ICD CODE: NORMAL
LABCORP, 190119: NORMAL
Lab: NORMAL
OTHER STN SPEC: NORMAL
STAT OF ADQ CVX/VAG CYTO-IMP: NORMAL

## 2022-02-15 NOTE — PROGRESS NOTES
LVM requesting Ms. Fowler to contact the office back ore review her [address] message at her earliest convenience to advise of Srikanth Harman MD's result note and/or recommendations.

## 2022-02-16 NOTE — PROGRESS NOTES
Normal pap smear, message sent if [address] active. Update PMH/HM: include: Date of pap, Cytology: wnl. For HR HPV results: list NEG or POS, when done.

## 2022-02-22 NOTE — PROGRESS NOTES
Written by Anisha Kaufman MD on 2/14/2022  7:47 AM EST View Full Comments  Seen by patient Su Peralta on 2/15/2022 10:21 AM

## 2022-02-28 ENCOUNTER — PATIENT MESSAGE (OUTPATIENT)
Dept: OBGYN CLINIC | Age: 32
End: 2022-02-28

## 2022-02-28 NOTE — PATIENT INSTRUCTIONS
Pelvic Pain: Care Instructions  Your Care Instructions     Pelvic pain, or pain in the lower belly, can have many causes. Often pelvic pain is not serious and gets better in a few days. If your pain continues or gets worse, you may need tests and treatment. Tell your doctor about any new symptoms. These may be signs of a serious problem. Follow-up care is a key part of your treatment and safety. Be sure to make and go to all appointments, and call your doctor if you are having problems. It's also a good idea to know your test results and keep a list of the medicines you take. How can you care for yourself at home? · Rest until you feel better. Lie down, and raise your legs by placing a pillow under your knees. · Drink plenty of fluids. You may find that small, frequent sips are easier on your stomach than if you drink a lot at once. Avoid drinks with carbonation or caffeine, such as soda pop, tea, or coffee. · Try eating several small meals instead of 2 or 3 large ones. Eat mild foods, such as rice, dry toast or crackers, bananas, and applesauce. Avoid fatty and spicy foods, other fruits, and alcohol until 48 hours after your symptoms have gone away. · Take an over-the-counter pain medicine, such as acetaminophen (Tylenol), ibuprofen (Advil, Motrin), or naproxen (Aleve). Read and follow all instructions on the label. · Do not take two or more pain medicines at the same time unless the doctor told you to. Many pain medicines have acetaminophen, which is Tylenol. Too much acetaminophen (Tylenol) can be harmful. · You can put a heating pad, a warm cloth, or moist heat on your belly to relieve pain. When should you call for help? Call 911  anytime you think you may need emergency care. For example, call if:    · You passed out (lost consciousness).    Call your doctor now or seek immediate medical care if:    · You have a new or higher fever.     · You have unusual vaginal bleeding.     · You have new or worse belly or pelvic pain.     · You have vaginal discharge that has increased in amount or smells bad.     · You are dizzy or lightheaded, or you feel like you may faint.     · You have symptoms of sepsis, such as:  ? Shortness of breath. ? Feeling very sick. ? Severe pain. ? A fast heart rate. ? Cool, pale, or clammy skin. ? Feeling confused. ? Feeling very sleepy, or you are hard to wake up. Watch closely for changes in your health, and be sure to contact your doctor if:    · You do not get better as expected. Where can you learn more? Go to http://www.gray.com/  Enter B514 in the search box to learn more about \"Pelvic Pain: Care Instructions. \"  Current as of: February 11, 2021               Content Version: 13.0  © 8336-1960 [employer]. Care instructions adapted under license by [employer] (which disclaims liability or warranty for this information). If you have questions about a medical condition or this instruction, always ask your healthcare professional. [address] any warranty or liability for your use of this information.

## 2022-03-02 ENCOUNTER — OFFICE VISIT (OUTPATIENT)
Dept: OBGYN CLINIC | Age: 32
End: 2022-03-02

## 2022-03-02 VITALS — DIASTOLIC BLOOD PRESSURE: 80 MMHG | WEIGHT: 191 LBS | SYSTOLIC BLOOD PRESSURE: 114 MMHG | BODY MASS INDEX: 31.78 KG/M2

## 2022-03-02 DIAGNOSIS — R10.2 PELVIC PAIN: Primary | ICD-10-CM

## 2022-03-02 DIAGNOSIS — N92.4 EXCESSIVE BLEEDING IN PREMENOPAUSAL PERIOD: ICD-10-CM

## 2022-03-02 PROCEDURE — 99213 OFFICE O/P EST LOW 20 MIN: CPT | Performed by: OBSTETRICS & GYNECOLOGY

## 2022-03-02 NOTE — PROGRESS NOTES
[facility]  [email or web address]    Jacklyn Arboleda MD, [address]       OB/GYN Follow-up visit    Chief Complaint: Follow up visit  Chief Complaint   Patient presents with    Menstrual Problem       History of Present Illness: This is a follow up visit from AE on  2/11/2022  She is having a follow up for painful and heavy cycles. Pt states this current cycle for the first 3 days she was changing Super tampons every hour. The patient reports having painful and heavy cycles for several months. She reports the symptoms are is unchanged. Aggravating factors include none. Alleviating factors include none. She does not have other concerns. Ultrasound today:   TRANSVAGINAL ULTRASOUND PERFORMED  UTERUS IS RETROVERTED, NORMAL IN SIZE AND ECHOGENICITY. ENDOMETRIUM MEASURES 8-9MM IN THICKNESS. NO EVIDENCE OF MASSES OR ABNORMALITIES ARE SEEN. RIGHT OVARY APPEARS WITHIN NORMAL LIMITS. LEFT OVARY APPEARS WITHIN NORMAL LIMITS. FREE FLUID SEEN IN THE CDS. LMP: Patient's last menstrual period was 02/24/2022. PFSH:  Past Medical History:   Diagnosis Date    Essential hypertension     Gestational hypertension     Pap smear for cervical cancer screening 10/31/2019; 2/11/22    Negative; Negative    PCOS (polycystic ovarian syndrome) 09/2018    Polycystic disease, ovaries      Past Surgical History:   Procedure Laterality Date    HX OTHER SURGICAL      wisdom teeth    HX WISDOM TEETH EXTRACTION  11/2017     Family History   Problem Relation Age of Onset    Endometriosis Mother     Other Mother         Abnormal uterine bleeding    No Known Problems Father     Breast Cancer Maternal Grandmother 52     Social History     Tobacco Use    Smoking status: Never Smoker    Smokeless tobacco: Never Used   Substance Use Topics    Alcohol use:  Yes     Alcohol/week: 3.0 standard drinks     Types: 3 Glasses of wine per week    Drug use: Never     No Known Allergies  Current Outpatient Medications   Medication Sig    FLUoxetine (PROzac) 20 mg capsule Take  by mouth daily. No current facility-administered medications for this visit. Review of Systems:  History obtained from the patient  Constitutional: see HPI  ENT ROS: negative for - hearing change, oral lesions or visual changes  Respiratory: negative for cough, wheezing or dyspnea on exertion  Cardiovascular: negative for chest pain, irregular heart beats, exertional chest pressure/discomfort  Gastrointestinal: negative for dysphagia, nausea and vomiting  Genito-Urinary ROS: see hpi  Inteument/breast: negative for rash, breast lump and nipple discharge  Musculoskeletal:negative for stiff joints, neck pain and muscle weakness  Endocrine ROS: negative for - breast changes, galactorrhea or temperature intolerance  Hematological and Lymphatic ROS: negative for - blood clots, bruising or swollen lymph nodes    Physical Exam:  Visit Vitals  /80   Wt 191 lb (86.6 kg)   BMI 31.78 kg/m²       GENERAL: alert, well appearing, and in no distress  ABDOMEN: soft, nontender, nondistended, no masses or organomegaly   EGBUS: no lesions, no inflammation, no masses  VULVA: normal appearing vulva with no masses, tenderness or lesions  VAGINA: normal appearing vagina with normal color, no lesions, blood tinged discharge  CERVIX: normal appearing cervix without discharge or lesions, non tender  UTERUS: uterus is normal size, shape, consistency and nontender   ADNEXA: normal adnexa in size, nontender and no masses  NEURO: alert, oriented, normal speech    Assessment:  Encounter Diagnoses   Name Primary?  Pelvic pain Yes    Excessive bleeding in premenopausal period        Plan:  The patient is advised that she should contact the office with any questions or concerns. She should make her routine annual gynecologic appointment if needed.   Labs, repeat tsh  We discussed progesterone only and non hormonal options for contraception including but not limited to condoms, IUDs, Nexplanon, and depo provera. We discussed potential causes of symptomatic bleeding: including but not limited to hormonal, medical, infection/inflammation and structural etiologies. We discussed options for managing symptoms including but not limited to observation, NSAIDS, hormonal management, IUDs  Defer surgical options  Consider IUD; kyleena vs mirena disc differences  rtc on menses  Larc ho. Reviewed labs    On this date, 3/2/2022,  I have spent 20 minutes reviewing previous notes, examining the patient, reviewing test results and face to face time with the patient discussing the diagnosis, plan of care and importance of compliance with the treatment plan and perfroming an exam.  We reviewed the planned hospital course as well as documented on the day of the hospitalization. Orders Placed This Encounter    CBC W/O DIFF    TSH 3RD GENERATION       No results found for this visit on 03/02/22. Johnathon Benitez MD    Physician review of ultrasound performed by technician    Today's ultrasound report and images were reviewed and discussed with the patient.   Please see images and imaging report entered by technician in PACS for more detail and progress note and diagnosis entered by MD.    Marquise Linda MD

## 2022-03-03 LAB
ERYTHROCYTE [DISTWIDTH] IN BLOOD BY AUTOMATED COUNT: 12.9 % (ref 11.5–14.5)
HCT VFR BLD AUTO: 42.9 % (ref 35–47)
HGB BLD-MCNC: 13.1 G/DL (ref 11.5–16)
MCH RBC QN AUTO: 27.8 PG (ref 26–34)
MCHC RBC AUTO-ENTMCNC: 30.5 G/DL (ref 30–36.5)
MCV RBC AUTO: 90.9 FL (ref 80–99)
NRBC # BLD: 0 K/UL (ref 0–0.01)
NRBC BLD-RTO: 0 PER 100 WBC
PLATELET # BLD AUTO: 283 K/UL (ref 150–400)
PMV BLD AUTO: 11.3 FL (ref 8.9–12.9)
RBC # BLD AUTO: 4.72 M/UL (ref 3.8–5.2)
TSH SERPL DL<=0.05 MIU/L-ACNC: 0.76 UIU/ML (ref 0.36–3.74)
WBC # BLD AUTO: 6.9 K/UL (ref 3.6–11)

## 2022-12-09 ENCOUNTER — TELEPHONE (OUTPATIENT)
Dept: FAMILY MEDICINE CLINIC | Age: 32
End: 2022-12-09

## 2022-12-09 NOTE — TELEPHONE ENCOUNTER
Attempted to reach patient in regards to NP appt 12/19 at 3pm with Dr. Odalis Boo. Provider will not be in clinic. Np appt is cancelled and call office to reschedule.

## 2022-12-30 ENCOUNTER — TELEPHONE (OUTPATIENT)
Dept: OBGYN CLINIC | Age: 32
End: 2022-12-30

## 2022-12-30 NOTE — TELEPHONE ENCOUNTER
Chuy Hess MD  to Me    TP    10:37 AM   Rec upt   Rec nsaids for heavy bleeding. Rec problem appt for cycles/disc options   If dizzy/weak: agree with urgent evaluation and screening for anemia. Inc water intake to replace volume in mean time.      Rosa Hernández MD            Patient advised of MD recommendations and will seek evaluation at the er    Patient did 2 upt and they are negative    Patient will call back to set up follow up     Patient  verbalized understanding

## 2022-12-30 NOTE — TELEPHONE ENCOUNTER
29year old patient last seen in the office on 2/11/2022 for ae    Patient reports increase her her Prozac and has not really had regular periods since that time    Patient reports a spotting period for the month of October    Patient calling to report that for the past three days she has had heavy bleeding changing a disc out every 3 hours ( patient states disc is equal to 5 tampons)    Patient is not having cramping now but two previously and is passing clots but does not know how back they are      Patient is not on birth control and had negative upt test about three weeks ago    Patient was advise to check  a upt test    Patient reports she feeling weak and dizzy and tired          Patient advised to increase po fluids    Patient was advised to have some one take her to er if she is feeling to bad    No ultrasound available    ?  Ov please advise    Thank you

## 2023-05-16 RX ORDER — FLUOXETINE HYDROCHLORIDE 20 MG/1
CAPSULE ORAL DAILY
COMMUNITY

## 2024-10-29 ENCOUNTER — TELEPHONE (OUTPATIENT)
Age: 34
End: 2024-10-29

## 2024-10-29 NOTE — TELEPHONE ENCOUNTER
Two patient identifiers used       34 year old patient last seen in the office on 2/11/2022 and has appointment tomorrow for ae.    Patient reports she has been seeing GI specialist and has been ordered to have ultrasound of right upper quadrant and lab work.    Patient wondering if she can get done in the office when she come for her appointment.    Patient was advised and provided the central scheduling number to call to set up the ultrasound and that she can check , they may be able to do the blood work . Patient would need to ask when she checks in and she would need to have the orders with her..  Patient verbalized understanding.

## 2024-10-30 ENCOUNTER — OFFICE VISIT (OUTPATIENT)
Age: 34
End: 2024-10-30
Payer: COMMERCIAL

## 2024-10-30 VITALS
HEART RATE: 91 BPM | SYSTOLIC BLOOD PRESSURE: 118 MMHG | DIASTOLIC BLOOD PRESSURE: 80 MMHG | WEIGHT: 204 LBS | OXYGEN SATURATION: 98 %

## 2024-10-30 DIAGNOSIS — N92.6 IRREGULAR MENSES: ICD-10-CM

## 2024-10-30 DIAGNOSIS — Z01.419 ENCOUNTER FOR GYNECOLOGICAL EXAMINATION: Primary | ICD-10-CM

## 2024-10-30 LAB
HCG, PREGNANCY, URINE, POC: NEGATIVE
VALID INTERNAL CONTROL, POC: YES

## 2024-10-30 PROCEDURE — 81025 URINE PREGNANCY TEST: CPT | Performed by: OBSTETRICS & GYNECOLOGY

## 2024-10-30 PROCEDURE — 99459 PELVIC EXAMINATION: CPT | Performed by: OBSTETRICS & GYNECOLOGY

## 2024-10-30 PROCEDURE — 99395 PREV VISIT EST AGE 18-39: CPT | Performed by: OBSTETRICS & GYNECOLOGY

## 2024-10-30 RX ORDER — MEDROXYPROGESTERONE ACETATE 10 MG
10 TABLET ORAL DAILY
Qty: 10 TABLET | Refills: 0 | Status: SHIPPED | OUTPATIENT
Start: 2024-10-30

## 2024-10-30 NOTE — PROGRESS NOTES
Shanell Galeas is a 34 y.o. female returns for an annual exam     Chief Complaint   Patient presents with    Annual Exam       No LMP recorded.  Her periods are irregular; pt reports not having a cycle since July  Problems: problems - has not had a cycle since July. History of irregular periods. She is not actively TTC but will like to have another baby. Has had negative UPTs  Birth Control: condoms .  Last Pap: see report obtained 2 year(s) ago.  She does not have a history of EMMANUEL 2, 3 or cervical cancer.         1. Have you been to the ER, urgent care clinic, or hospitalized since your last visit? No    2. Have you seen or consulted any other health care providers outside of the [facility] since your last visit? No    Examination chaperoned by Any Krause LPN.  
COMPARISON         A pelvic exam and associated supplies were necessary for evaluation of the patient for this type of visit.      Provider chaperoned and assisted by: Morena Mullins MA for pelvic exam and additional supplies used for pelvic exam.

## 2024-11-05 ENCOUNTER — HOSPITAL ENCOUNTER (OUTPATIENT)
Facility: HOSPITAL | Age: 34
Discharge: HOME | End: 2024-11-08
Payer: COMMERCIAL

## 2024-11-05 DIAGNOSIS — R10.9 RIGHT SIDED ABDOMINAL PAIN: ICD-10-CM

## 2024-11-05 DIAGNOSIS — N92.6 IRREGULAR MENSES: ICD-10-CM

## 2024-11-05 LAB
EST. AVERAGE GLUCOSE BLD GHB EST-MCNC: 103 MG/DL
HBA1C MFR BLD: 5.2 % (ref 4–5.6)
PROLACTIN SERPL-MCNC: 8.4 NG/ML
T4 FREE SERPL-MCNC: 1 NG/DL (ref 0.8–1.5)
TSH SERPL DL<=0.05 MIU/L-ACNC: 0.66 UIU/ML (ref 0.36–3.74)

## 2024-11-05 PROCEDURE — 76705 ECHO EXAM OF ABDOMEN: CPT

## 2024-11-06 LAB — DHEA-S SERPL-MCNC: 242 UG/DL (ref 84.8–378)

## 2024-11-09 LAB
17OHP SERPL-MCNC: 26 NG/DL
TESTOST FREE SERPL-MCNC: 2.7 PG/ML (ref 0–4.2)
TESTOST SERPL-MCNC: 26.5 NG/DL (ref 10–55)

## 2024-11-12 NOTE — RESULT ENCOUNTER NOTE
Normal, reviewed  1DayLater message sent if active.  Confirm GYN fu scheduled.   Rec PCP fu for weight management  Rec 2 hr fast/gtt if not already done.